# Patient Record
Sex: MALE | Race: BLACK OR AFRICAN AMERICAN | NOT HISPANIC OR LATINO | URBAN - METROPOLITAN AREA
[De-identification: names, ages, dates, MRNs, and addresses within clinical notes are randomized per-mention and may not be internally consistent; named-entity substitution may affect disease eponyms.]

---

## 2018-01-22 ENCOUNTER — INPATIENT (INPATIENT)
Facility: HOSPITAL | Age: 53
LOS: 0 days | Discharge: HOME | End: 2018-01-23
Attending: EMERGENCY MEDICINE | Admitting: INTERNAL MEDICINE

## 2018-02-05 DIAGNOSIS — R07.9 CHEST PAIN, UNSPECIFIED: ICD-10-CM

## 2018-02-05 DIAGNOSIS — R56.9 UNSPECIFIED CONVULSIONS: ICD-10-CM

## 2018-02-05 DIAGNOSIS — Z82.49 FAMILY HISTORY OF ISCHEMIC HEART DISEASE AND OTHER DISEASES OF THE CIRCULATORY SYSTEM: ICD-10-CM

## 2018-07-03 ENCOUNTER — EMERGENCY (EMERGENCY)
Facility: HOSPITAL | Age: 53
LOS: 0 days | Discharge: HOME | End: 2018-07-03
Admitting: PHYSICIAN ASSISTANT

## 2018-07-03 VITALS
RESPIRATION RATE: 18 BRPM | DIASTOLIC BLOOD PRESSURE: 81 MMHG | HEART RATE: 71 BPM | TEMPERATURE: 98 F | SYSTOLIC BLOOD PRESSURE: 126 MMHG | OXYGEN SATURATION: 95 %
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
## 2018-07-03 DIAGNOSIS — S30.817A ABRASION OF ANUS, INITIAL ENCOUNTER: ICD-10-CM

## 2018-07-03 DIAGNOSIS — R21 RASH AND OTHER NONSPECIFIC SKIN ERUPTION: ICD-10-CM
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
## 2018-07-03 NOTE — ED PROVIDER NOTE - SKIN, MLM
+ small ulceration along perineum without surrounding erythema ; no warmth ; no fluctuance ; no weeping/drainage

## 2018-07-03 NOTE — ED PROVIDER NOTE - OBJECTIVE STATEMENT
51 y/o M, no significant PMHx, presents to the ED with complaints of a 'rash' under his scrotum x three days. Patient states that he scratched area and sustained an abrasion. He then put on his sisters lotion which irritated his skin. He denies any surrounding redness, fever, abdominal pain, urinary symptoms, and swelling.

## 2018-07-03 NOTE — ED ADULT NURSE NOTE - OBJECTIVE STATEMENT
PT states he was itchy in groin area and applied scented lotion to area. Pt has been itching a lot and now has abrasion

## 2019-01-06 ENCOUNTER — INPATIENT (INPATIENT)
Facility: HOSPITAL | Age: 54
LOS: 1 days | Discharge: HOME | End: 2019-01-08
Attending: INTERNAL MEDICINE | Admitting: INTERNAL MEDICINE

## 2019-01-06 VITALS
TEMPERATURE: 97 F | RESPIRATION RATE: 20 BRPM | OXYGEN SATURATION: 100 % | SYSTOLIC BLOOD PRESSURE: 130 MMHG | HEART RATE: 100 BPM | DIASTOLIC BLOOD PRESSURE: 66 MMHG

## 2019-01-06 LAB
ALBUMIN SERPL ELPH-MCNC: 4.8 G/DL — SIGNIFICANT CHANGE UP (ref 3.5–5.2)
ALP SERPL-CCNC: 41 U/L — SIGNIFICANT CHANGE UP (ref 30–115)
ALT FLD-CCNC: 13 U/L — SIGNIFICANT CHANGE UP (ref 0–41)
ANION GAP SERPL CALC-SCNC: 18 MMOL/L — HIGH (ref 7–14)
APPEARANCE UR: ABNORMAL
AST SERPL-CCNC: 28 U/L — SIGNIFICANT CHANGE UP (ref 0–41)
BACTERIA # UR AUTO: ABNORMAL /HPF
BASE EXCESS BLDV CALC-SCNC: 2.1 MMOL/L — HIGH (ref -2–2)
BASOPHILS # BLD AUTO: 0.03 K/UL — SIGNIFICANT CHANGE UP (ref 0–0.2)
BASOPHILS NFR BLD AUTO: 0.5 % — SIGNIFICANT CHANGE UP (ref 0–1)
BILIRUB SERPL-MCNC: 0.5 MG/DL — SIGNIFICANT CHANGE UP (ref 0.2–1.2)
BILIRUB UR-MCNC: NEGATIVE — SIGNIFICANT CHANGE UP
BUN SERPL-MCNC: 13 MG/DL — SIGNIFICANT CHANGE UP (ref 10–20)
CA-I SERPL-SCNC: 1.16 MMOL/L — SIGNIFICANT CHANGE UP (ref 1.12–1.3)
CALCIUM SERPL-MCNC: 9.5 MG/DL — SIGNIFICANT CHANGE UP (ref 8.5–10.1)
CHLORIDE SERPL-SCNC: 99 MMOL/L — SIGNIFICANT CHANGE UP (ref 98–110)
CO2 SERPL-SCNC: 23 MMOL/L — SIGNIFICANT CHANGE UP (ref 17–32)
COLOR SPEC: YELLOW — SIGNIFICANT CHANGE UP
CREAT SERPL-MCNC: 1.2 MG/DL — SIGNIFICANT CHANGE UP (ref 0.7–1.5)
DIFF PNL FLD: NEGATIVE — SIGNIFICANT CHANGE UP
EOSINOPHIL # BLD AUTO: 0.14 K/UL — SIGNIFICANT CHANGE UP (ref 0–0.7)
EOSINOPHIL NFR BLD AUTO: 2.4 % — SIGNIFICANT CHANGE UP (ref 0–8)
EPI CELLS # UR: ABNORMAL /HPF
FLU A RESULT: NEGATIVE — SIGNIFICANT CHANGE UP
FLU A RESULT: NEGATIVE — SIGNIFICANT CHANGE UP
FLUAV AG NPH QL: NEGATIVE — SIGNIFICANT CHANGE UP
FLUBV AG NPH QL: NEGATIVE — SIGNIFICANT CHANGE UP
GAS PNL BLDV: 143 MMOL/L — SIGNIFICANT CHANGE UP (ref 136–145)
GAS PNL BLDV: SIGNIFICANT CHANGE UP
GLUCOSE SERPL-MCNC: 111 MG/DL — HIGH (ref 70–99)
GLUCOSE UR QL: NEGATIVE MG/DL — SIGNIFICANT CHANGE UP
HCO3 BLDV-SCNC: 27 MMOL/L — SIGNIFICANT CHANGE UP (ref 22–29)
HCT VFR BLD CALC: 41 % — LOW (ref 42–52)
HCT VFR BLDA CALC: 43.1 % — SIGNIFICANT CHANGE UP (ref 34–44)
HGB BLD CALC-MCNC: 14 G/DL — SIGNIFICANT CHANGE UP (ref 14–18)
HGB BLD-MCNC: 14.3 G/DL — SIGNIFICANT CHANGE UP (ref 14–18)
IMM GRANULOCYTES NFR BLD AUTO: 0.3 % — SIGNIFICANT CHANGE UP (ref 0.1–0.3)
KETONES UR-MCNC: NEGATIVE — SIGNIFICANT CHANGE UP
LACTATE BLDV-MCNC: 1.3 MMOL/L — SIGNIFICANT CHANGE UP (ref 0.5–1.6)
LACTATE SERPL-SCNC: 2.9 MMOL/L — HIGH (ref 0.5–2.2)
LEUKOCYTE ESTERASE UR-ACNC: NEGATIVE — SIGNIFICANT CHANGE UP
LIDOCAIN IGE QN: 32 U/L — SIGNIFICANT CHANGE UP (ref 7–60)
LYMPHOCYTES # BLD AUTO: 1.56 K/UL — SIGNIFICANT CHANGE UP (ref 1.2–3.4)
LYMPHOCYTES # BLD AUTO: 27.2 % — SIGNIFICANT CHANGE UP (ref 20.5–51.1)
MCHC RBC-ENTMCNC: 31.8 PG — HIGH (ref 27–31)
MCHC RBC-ENTMCNC: 34.9 G/DL — SIGNIFICANT CHANGE UP (ref 32–37)
MCV RBC AUTO: 91.3 FL — SIGNIFICANT CHANGE UP (ref 80–94)
MONOCYTES # BLD AUTO: 0.45 K/UL — SIGNIFICANT CHANGE UP (ref 0.1–0.6)
MONOCYTES NFR BLD AUTO: 7.8 % — SIGNIFICANT CHANGE UP (ref 1.7–9.3)
NEUTROPHILS # BLD AUTO: 3.54 K/UL — SIGNIFICANT CHANGE UP (ref 1.4–6.5)
NEUTROPHILS NFR BLD AUTO: 61.8 % — SIGNIFICANT CHANGE UP (ref 42.2–75.2)
NITRITE UR-MCNC: NEGATIVE — SIGNIFICANT CHANGE UP
NRBC # BLD: 0 /100 WBCS — SIGNIFICANT CHANGE UP (ref 0–0)
PCO2 BLDV: 43 MMHG — SIGNIFICANT CHANGE UP (ref 41–51)
PH BLDV: 7.41 — SIGNIFICANT CHANGE UP (ref 7.26–7.43)
PH UR: 7 — SIGNIFICANT CHANGE UP (ref 5–8)
PLATELET # BLD AUTO: 167 K/UL — SIGNIFICANT CHANGE UP (ref 130–400)
PO2 BLDV: 79 MMHG — HIGH (ref 20–40)
POTASSIUM BLDV-SCNC: 4.1 MMOL/L — SIGNIFICANT CHANGE UP (ref 3.3–5.6)
POTASSIUM SERPL-MCNC: 5.4 MMOL/L — HIGH (ref 3.5–5)
POTASSIUM SERPL-SCNC: 5.4 MMOL/L — HIGH (ref 3.5–5)
PROT SERPL-MCNC: 7.7 G/DL — SIGNIFICANT CHANGE UP (ref 6–8)
PROT UR-MCNC: ABNORMAL MG/DL
RBC # BLD: 4.49 M/UL — LOW (ref 4.7–6.1)
RBC # FLD: 13.2 % — SIGNIFICANT CHANGE UP (ref 11.5–14.5)
RSV RESULT: NEGATIVE — SIGNIFICANT CHANGE UP
RSV RNA RESP QL NAA+PROBE: NEGATIVE — SIGNIFICANT CHANGE UP
SAO2 % BLDV: 96 % — SIGNIFICANT CHANGE UP
SODIUM SERPL-SCNC: 140 MMOL/L — SIGNIFICANT CHANGE UP (ref 135–146)
SP GR SPEC: 1.02 — SIGNIFICANT CHANGE UP (ref 1.01–1.03)
TROPONIN T SERPL-MCNC: <0.01 NG/ML — SIGNIFICANT CHANGE UP
UROBILINOGEN FLD QL: 0.2 MG/DL — SIGNIFICANT CHANGE UP (ref 0.2–0.2)
WBC # BLD: 5.74 K/UL — SIGNIFICANT CHANGE UP (ref 4.8–10.8)
WBC # FLD AUTO: 5.74 K/UL — SIGNIFICANT CHANGE UP (ref 4.8–10.8)

## 2019-01-06 RX ORDER — SODIUM CHLORIDE 9 MG/ML
1000 INJECTION INTRAMUSCULAR; INTRAVENOUS; SUBCUTANEOUS ONCE
Qty: 0 | Refills: 0 | Status: COMPLETED | OUTPATIENT
Start: 2019-01-06 | End: 2019-01-06

## 2019-01-06 RX ORDER — MORPHINE SULFATE 50 MG/1
4 CAPSULE, EXTENDED RELEASE ORAL ONCE
Qty: 0 | Refills: 0 | Status: DISCONTINUED | OUTPATIENT
Start: 2019-01-06 | End: 2019-01-06

## 2019-01-06 RX ORDER — ONDANSETRON 8 MG/1
4 TABLET, FILM COATED ORAL ONCE
Qty: 0 | Refills: 0 | Status: COMPLETED | OUTPATIENT
Start: 2019-01-06 | End: 2019-01-06

## 2019-01-06 RX ADMIN — ONDANSETRON 4 MILLIGRAM(S): 8 TABLET, FILM COATED ORAL at 17:03

## 2019-01-06 RX ADMIN — SODIUM CHLORIDE 2000 MILLILITER(S): 9 INJECTION INTRAMUSCULAR; INTRAVENOUS; SUBCUTANEOUS at 20:16

## 2019-01-06 RX ADMIN — SODIUM CHLORIDE 2000 MILLILITER(S): 9 INJECTION INTRAMUSCULAR; INTRAVENOUS; SUBCUTANEOUS at 17:03

## 2019-01-06 RX ADMIN — MORPHINE SULFATE 4 MILLIGRAM(S): 50 CAPSULE, EXTENDED RELEASE ORAL at 17:03

## 2019-01-06 RX ADMIN — MORPHINE SULFATE 4 MILLIGRAM(S): 50 CAPSULE, EXTENDED RELEASE ORAL at 21:06

## 2019-01-06 NOTE — ED PROVIDER NOTE - NS ED ROS FT
Constitutional:  no fevers, no chills, no malaise  ENMT: No neck pain or stiffness, no nasal congestion, no ear pain, no throat pain  Cardiac:  No chest pain  Respiratory:  see hpi  GI:  see hpi  :  No dysuria, frequency or burning.  MS:  No back pain, no joint pain.  Neuro:  see hpi  Skin:  No skin rash  Except as documented in the HPI,  all other systems are negative

## 2019-01-06 NOTE — ED PROVIDER NOTE - MEDICAL DECISION MAKING DETAILS
Pt here with multiple complaints, abd pain/low back pain, cough, congestion, uri symptoms.  pt also had 2 breakthrough seizures.  extensive work up done in the ED negative for anything acute.  I spoke to neuro.  will admit pt to the [facility] epilepsy unit given the breakthrough seizures.

## 2019-01-06 NOTE — ED PROVIDER NOTE - CARE PLAN
Principal Discharge DX:	Seizure disorder  Secondary Diagnosis:	Abdominal pain  Secondary Diagnosis:	URI (upper respiratory infection)

## 2019-01-06 NOTE — ED ADULT NURSE NOTE - OBJECTIVE STATEMENT
Pt presents to ED with c/o abdominal pain and lower back pain. Pt also reports nonproductive cough and congestion. Pt endorses seizure last night and this morning, currently on antiepileptic medication.

## 2019-01-06 NOTE — ED PROVIDER NOTE - PROGRESS NOTE DETAILS
pt had normal CCTA in january 2018 a/p:  pt with abd pain, low back pain, chest pain and sob for one week.  pt also had breakthrough seizure yesterday and today.  p: ct, labs, ivf, supportive care, cxr, ekg, reassess. I spoke to neuro NP.  will admit to [facility] epilepsy unit. bed management aware

## 2019-01-06 NOTE — ED PROVIDER NOTE - OBJECTIVE STATEMENT
54 yo m with pmh of seizure disorder on keppra and phenobarb compliant with meds presents with multiple complaints.  pt reports he has breakthough seizure approx once a week.  pt with abd pain and low back pain for one week.  pt also with cough, congestion, uri symptoms and fever for one week.  pt had seziure last night and then this morning.  after seizure this morning pt went to [employer] and wasn't feeling well and came to hospital.  no vomiting, no headache, no neck pain, no neck stiffness.  no urinary complaints.  pt also with pleuritic pain, cough.  ? history of possible blood clot

## 2019-01-06 NOTE — ED ADULT NURSE NOTE - NSIMPLEMENTINTERV_GEN_ALL_ED
Implemented All Fall with Harm Risk Interventions:  [place] to call system. Call bell, personal items and telephone within reach. Instruct patient to call for assistance. Room bathroom lighting operational. Non-slip footwear when patient is off stretcher. Physically safe environment: no spills, clutter or unnecessary equipment. Stretcher in lowest position, wheels locked, appropriate side rails in place. Provide visual cue, wrist band, yellow gown, etc. Monitor gait and stability. Monitor for mental status changes and reorient to person, place, and time. Review medications for side effects contributing to fall risk. Reinforce activity limits and safety measures with patient and family. Provide visual clues: red socks.

## 2019-01-06 NOTE — ED ADULT NURSE REASSESSMENT NOTE - NS ED NURSE REASSESS COMMENT FT1
Received from previous rn; pt aox4 . complaining of abdominal pain. In no respiratory distress. vss. pending ua.  IVF infusing as per order, pain medications administered as per order. pt encouraged to urinate for ua.  Will continue to monitor/assess

## 2019-01-06 NOTE — ED PROVIDER NOTE - PHYSICAL EXAMINATION
CONSTITUTIONAL: Well-developed; well-nourished; in no acute distress, nontoxic appearing  SKIN: skin exam is warm and dry,  HEAD: Normocephalic; atraumatic.  EYES: eomi  ENT: MMM, no nasal congestion  NECK: Supple; non tender.  ROM intact.  CARD: S1, S2 normal, no murmur  RESP: No wheezes, rales or rhonchi. Good air movement bilaterally  ABD: soft; mild diffuse tenderness, no rebound, no guarding  EXT: Normal ROM. No cyanosis   NEURO: awake, alert, following commands, oriented, grossly unremarkable. No Focal deficits. GCS 15.   motor/sensatioin intact in all 4 ext.  PSYCH: Cooperative, appropriate.

## 2019-01-07 DIAGNOSIS — N18.2 CHRONIC KIDNEY DISEASE, STAGE 2 (MILD): ICD-10-CM

## 2019-01-07 DIAGNOSIS — J06.9 ACUTE UPPER RESPIRATORY INFECTION, UNSPECIFIED: ICD-10-CM

## 2019-01-07 DIAGNOSIS — G40.909 EPILEPSY, UNSPECIFIED, NOT INTRACTABLE, WITHOUT STATUS EPILEPTICUS: ICD-10-CM

## 2019-01-07 LAB
ALBUMIN SERPL ELPH-MCNC: 3.9 G/DL — SIGNIFICANT CHANGE UP (ref 3.5–5.2)
ALP SERPL-CCNC: 43 U/L — SIGNIFICANT CHANGE UP (ref 30–115)
ALT FLD-CCNC: 10 U/L — SIGNIFICANT CHANGE UP (ref 0–41)
ANION GAP SERPL CALC-SCNC: 9 MMOL/L — SIGNIFICANT CHANGE UP (ref 7–14)
AST SERPL-CCNC: 15 U/L — SIGNIFICANT CHANGE UP (ref 0–41)
BILIRUB SERPL-MCNC: 0.6 MG/DL — SIGNIFICANT CHANGE UP (ref 0.2–1.2)
BUN SERPL-MCNC: 10 MG/DL — SIGNIFICANT CHANGE UP (ref 10–20)
CALCIUM SERPL-MCNC: 8.3 MG/DL — LOW (ref 8.5–10.1)
CHLORIDE SERPL-SCNC: 106 MMOL/L — SIGNIFICANT CHANGE UP (ref 98–110)
CO2 SERPL-SCNC: 26 MMOL/L — SIGNIFICANT CHANGE UP (ref 17–32)
CREAT SERPL-MCNC: 1.1 MG/DL — SIGNIFICANT CHANGE UP (ref 0.7–1.5)
GLUCOSE SERPL-MCNC: 101 MG/DL — HIGH (ref 70–99)
HCT VFR BLD CALC: 37 % — LOW (ref 42–52)
HGB BLD-MCNC: 12.9 G/DL — LOW (ref 14–18)
MAGNESIUM SERPL-MCNC: 2.1 MG/DL — SIGNIFICANT CHANGE UP (ref 1.8–2.4)
MCHC RBC-ENTMCNC: 32.5 PG — HIGH (ref 27–31)
MCHC RBC-ENTMCNC: 34.9 G/DL — SIGNIFICANT CHANGE UP (ref 32–37)
MCV RBC AUTO: 93.2 FL — SIGNIFICANT CHANGE UP (ref 80–94)
NRBC # BLD: 0 /100 WBCS — SIGNIFICANT CHANGE UP (ref 0–0)
PHENOBARB SERPL-MCNC: 4.1 UG/ML — LOW (ref 15–40)
PLATELET # BLD AUTO: 150 K/UL — SIGNIFICANT CHANGE UP (ref 130–400)
POTASSIUM SERPL-MCNC: 4.3 MMOL/L — SIGNIFICANT CHANGE UP (ref 3.5–5)
POTASSIUM SERPL-SCNC: 4.3 MMOL/L — SIGNIFICANT CHANGE UP (ref 3.5–5)
PROT SERPL-MCNC: 6.7 G/DL — SIGNIFICANT CHANGE UP (ref 6–8)
RBC # BLD: 3.97 M/UL — LOW (ref 4.7–6.1)
RBC # FLD: 13.2 % — SIGNIFICANT CHANGE UP (ref 11.5–14.5)
SODIUM SERPL-SCNC: 141 MMOL/L — SIGNIFICANT CHANGE UP (ref 135–146)
VALPROATE SERPL-MCNC: <3 UG/ML — LOW (ref 50–100)
WBC # BLD: 4.92 K/UL — SIGNIFICANT CHANGE UP (ref 4.8–10.8)
WBC # FLD AUTO: 4.92 K/UL — SIGNIFICANT CHANGE UP (ref 4.8–10.8)

## 2019-01-07 RX ORDER — CHLORHEXIDINE GLUCONATE 213 G/1000ML
1 SOLUTION TOPICAL EVERY 12 HOURS
Qty: 0 | Refills: 0 | Status: DISCONTINUED | OUTPATIENT
Start: 2019-01-07 | End: 2019-01-08

## 2019-01-07 RX ORDER — LEVETIRACETAM 250 MG/1
500 TABLET, FILM COATED ORAL EVERY 24 HOURS
Qty: 0 | Refills: 0 | Status: DISCONTINUED | OUTPATIENT
Start: 2019-01-08 | End: 2019-01-08

## 2019-01-07 RX ORDER — ACETAMINOPHEN 500 MG
650 TABLET ORAL EVERY 6 HOURS
Qty: 0 | Refills: 0 | Status: DISCONTINUED | OUTPATIENT
Start: 2019-01-07 | End: 2019-01-08

## 2019-01-07 RX ORDER — LEVETIRACETAM 250 MG/1
500 TABLET, FILM COATED ORAL
Qty: 0 | Refills: 0 | Status: DISCONTINUED | OUTPATIENT
Start: 2019-01-07 | End: 2019-01-07

## 2019-01-07 RX ORDER — HEPARIN SODIUM 5000 [USP'U]/ML
5000 INJECTION INTRAVENOUS; SUBCUTANEOUS EVERY 12 HOURS
Qty: 0 | Refills: 0 | Status: DISCONTINUED | OUTPATIENT
Start: 2019-01-07 | End: 2019-01-08

## 2019-01-07 RX ORDER — PHENOBARBITAL 60 MG
32.4 TABLET ORAL EVERY 12 HOURS
Qty: 0 | Refills: 0 | Status: DISCONTINUED | OUTPATIENT
Start: 2019-01-07 | End: 2019-01-08

## 2019-01-07 RX ORDER — INFLUENZA VIRUS VACCINE 15; 15; 15; 15 UG/.5ML; UG/.5ML; UG/.5ML; UG/.5ML
0.5 SUSPENSION INTRAMUSCULAR ONCE
Qty: 0 | Refills: 0 | Status: DISCONTINUED | OUTPATIENT
Start: 2019-01-07 | End: 2019-01-08

## 2019-01-07 RX ORDER — LEVETIRACETAM 250 MG/1
1000 TABLET, FILM COATED ORAL AT BEDTIME
Qty: 0 | Refills: 0 | Status: DISCONTINUED | OUTPATIENT
Start: 2019-01-07 | End: 2019-01-08

## 2019-01-07 RX ORDER — PHENOBARBITAL 60 MG
30 TABLET ORAL
Qty: 0 | Refills: 0 | Status: DISCONTINUED | OUTPATIENT
Start: 2019-01-07 | End: 2019-01-07

## 2019-01-07 RX ORDER — HEPARIN SODIUM 5000 [USP'U]/ML
5000 INJECTION INTRAVENOUS; SUBCUTANEOUS EVERY 12 HOURS
Qty: 0 | Refills: 0 | Status: DISCONTINUED | OUTPATIENT
Start: 2019-01-07 | End: 2019-01-07

## 2019-01-07 RX ORDER — PANTOPRAZOLE SODIUM 20 MG/1
40 TABLET, DELAYED RELEASE ORAL
Qty: 0 | Refills: 0 | Status: DISCONTINUED | OUTPATIENT
Start: 2019-01-07 | End: 2019-01-08

## 2019-01-07 RX ADMIN — Medication 650 MILLIGRAM(S): at 23:44

## 2019-01-07 RX ADMIN — Medication 32.4 MILLIGRAM(S): at 23:44

## 2019-01-07 RX ADMIN — LEVETIRACETAM 1000 MILLIGRAM(S): 250 TABLET, FILM COATED ORAL at 21:23

## 2019-01-07 RX ADMIN — PANTOPRAZOLE SODIUM 40 MILLIGRAM(S): 20 TABLET, DELAYED RELEASE ORAL at 06:56

## 2019-01-07 RX ADMIN — Medication 30 MILLIGRAM(S): at 06:57

## 2019-01-07 RX ADMIN — LEVETIRACETAM 500 MILLIGRAM(S): 250 TABLET, FILM COATED ORAL at 06:56

## 2019-01-07 NOTE — H&P ADULT - REASON FOR ADMISSION
Repeated seizures  since last night/  vague pain  in chest/ back, abdomen and all over the body  Fever, cough/---------n/a

## 2019-01-07 NOTE — H&P ADULT - HISTORY OF PRESENT ILLNESS
Objective Statement: 54 yo m with pmh of seizure disorder on keppra and phenobarb compliant with meds presents with multiple complaints.  pt reports he has breakthough seizure approx once a week.  pt with abd pain and low back pain for one week.  pt also with cough, congestion, uri symptoms and fever for one week.  pt had seziure last night and then this morning.  after seizure this morning pt went to [place] and wasn't feeling well and came to hospital.  no vomiting, no headache, no neck pain, no neck stiffness.  no urinary complain

## 2019-01-07 NOTE — H&P ADULT - NSHPLABSRESULTS_GEN_ALL_CORE
14.3   5.74  )-----------( 167      ( 2019 16:50 )             41.0     -06    140  |  99  |  13  ----------------------------<  111<H>  5.4<H>   |  23  |  1.2    Ca    9.5      2019 16:50    TPro  7.7  /  Alb  4.8  /  TBili  0.5  /  DBili  x   /  AST  28  /  ALT  13  /  AlkPhos  41            Urinalysis Basic - ( 2019 21:50 )    Color: Yellow / Appearance: Cloudy / S.020 / pH: x  Gluc: x / Ketone: Negative  / Bili: Negative / Urobili: 0.2 mg/dL   Blood: x / Protein: Trace mg/dL / Nitrite: Negative   Leuk Esterase: Negative / RBC: x / WBC x   Sq Epi: x / Non Sq Epi: Occasional /HPF / Bacteria: Few /HPF        Lactate Trend   @ 16:50 Lactate:2.9     CARDIAC MARKERS ( 2019 16:50 )  x     / <0.01 ng/mL / x     / x     / x          CAPILLARY BLOOD GLUCOSE

## 2019-01-07 NOTE — CONSULT NOTE ADULT - ATTENDING COMMENTS
Agree with the history  The history of the events/seizures  and the syndrome is not consistent.  The F/U in the past as described above is also not done regularly.  Will do a trough level  will do the V-EEG  no change in AED

## 2019-01-07 NOTE — H&P ADULT - NSHPREVIEWOFSYSTEMS_GEN_ALL_CORE
REVIEW OF SYSTEMS:      CONSTITUTIONAL:     fever===n/a  EYES/ENT:                      pain   all over the body aand neck  NECK:       RESPIRATORY:          copd------------n/a  CARDIOVASCULAR:     htn   n/a  GASTROINTESTINAL:    dm-----n/a   GENITOURINARY:      Nil contributary  NEUROLOGICAL:      H/o seizures since child sanchez  SKIN:

## 2019-01-07 NOTE — H&P ADULT - ASSESSMENT
Patient is a 53y old  Male who presents with a chief complaint of    sizures/ aching all ovr the body ever since                                                                                                                                                                                                                                                                                    HEALTH ISSUES - PROBLEM Dx:seizures/  body ache

## 2019-01-07 NOTE — PROGRESS NOTE ADULT - SUBJECTIVE AND OBJECTIVE BOX
JEFF CHO  53y  Male      Patient is a 53y old  Male who presents with a chief complaint of Repeated seizures  since last night/  vague pain  in chest/ back, abdomen and all over the body  Fever, cough/---------n/a (2019 13:21)      INTERVAL HPI/OVERNIGHT EVENTS:    patient admitted for VEEG monitoring  -seizure precautions  -labs and imaging studies reviewed (negative so far)    -Vital Signs Last 24 Hrs  T(C): 35.8 (2019 14:41), Max: 36.1 (2019 20:13)  T(F): 96.4 (2019 14:41), Max: 97 (2019 20:13)  HR: 80 (2019 14:41) (60 - 100)  BP: 124/65 (2019 14:41) (111/59 - 130/66)  RR: 18 (2019 14:41) (18 - 20)  SpO2: 97% (2019 00:00) (96% - 100%)    PHYSICAL EXAM:  GENERAL: NAD, tired, does not want to be bothered   HEAD:  Atraumatic, Normocephalic  EYES: EOMI, PERRLA, conjunctiva and sclera clear  NERVOUS SYSTEM:  Alert & Oriented X 3  CHEST/LUNG: Clear to percussion bilaterally; No rales, rhonchi, wheezing, or rubs  CV/HEART: Regular rate and rhythm; No murmurs, rubs, or gallops  GI/ABDOMEN: Soft, Nontender, Nondistended; Bowel sounds present  EXTREMITIES:  2+ Peripheral Pulses, No clubbing, cyanosis, or edema  SKIN: No rashes or lesions    LAB:                        12.9   4.92  )-----------( 150      ( 2019 12:07 )             37.0     01-07    141  |  106  |  10  ----------------------------<  101<H>  4.3   |  26  |  1.1    Ca    8.3<L>      2019 12:07  Mg     2.1     01-07    TPro  6.7  /  Alb  3.9  /  TBili  0.6  /  DBili  x   /  AST  15  /  ALT  10  /  AlkPhos  43  01-07    CARDIAC MARKERS ( 2019 16:50 )  x     / <0.01 ng/mL / x     / x     / x        Daily Height in cm: 182.88 (2019 01:55)    Daily   CAPILLARY BLOOD GLUCOSE    Urinalysis Basic - ( 2019 21:50 )    Color: Yellow / Appearance: Cloudy / S.020 / pH: x  Gluc: x / Ketone: Negative  / Bili: Negative / Urobili: 0.2 mg/dL   Blood: x / Protein: Trace mg/dL / Nitrite: Negative   Leuk Esterase: Negative / RBC: x / WBC x   Sq Epi: x / Non Sq Epi: Occasional /HPF / Bacteria: Few /HPF    LIVER FUNCTIONS - ( 2019 12:07 )  Alb: 3.9 g/dL / Pro: 6.7 g/dL / ALK PHOS: 43 U/L / ALT: 10 U/L / AST: 15 U/L / GGT: x           RADIOLOGY:    Imaging Personally Reviewed:  [Y ] YES  [ ] NO    HEALTH ISSUES - PROBLEM Dx:  URI (upper respiratory infection): URI (upper respiratory infection)  Seizure disorder: Seizure disorder    MEDS:  heparin  Injectable 5000 Unit(s) SubCutaneous every 12 hours  influenza   Vaccine 0.5 milliLiter(s) IntraMuscular once  levETIRAcetam 1000 milliGRAM(s) Oral at bedtime  LORazepam   Injectable 2 milliGRAM(s) IV Push three times a day PRN  pantoprazole    Tablet 40 milliGRAM(s) Oral before breakfast  PHENobarbital 32.4 milliGRAM(s) Oral every 12 hours

## 2019-01-07 NOTE — CONSULT NOTE ADULT - SUBJECTIVE AND OBJECTIVE BOX
Neurology/Epilepsy Consult:    JEFF CHO 53y Male  MRN-[id number]    Patient is a 53y old  Male who presents with a chief complaint of seizures last night, pain  in chest/ back, abdomen and all over the body with Fever, cough      HPI: History obtained from patient and family, as well as PMD office. EMR and I-STOP reviewed.   Patient reports having history of seizures since infancy. He started taking AED about 20 years ago. Patient current seizure frequency is about 1-2 episodes per week, and he is not usually going to the hospital for that. He came to ED because of body aches with chest discomfort, as well as abdominal pain. Patient states he is being followed by PMD Dr. Beaulieu and neurologist Dr. Bustos. Patient states he is taking Phenobarbital 30mg q12hrs, Keppra 500mg in AM and 1000mg in HS, Depakote 500mg in AM and 1000mg in HS. He initially said does not remember exactly what pharmacy he is using, but then gave me locations of 2 different pharmacies where he was filling medications.    I contacted Dr. Bustos office, and was told last time patient was seen in 2012. Per Dr. Beaulieu office, patient was last seen in 2017. Per Dr. Beaulieu records, patient was on Keppra 500mg q8hrs, Phenobarbital 60mg q12hrs, Depakote 500mg daily. Both pharmacies have no record of patient filling any medications in the last 18 months.   Stat levels of phenobarbital and VPA ordered this morning (it must be noted that patient received Phenobarbital 30mg 5 hrs prior to blood test). Phenobarbital level 4.1, VPA <3.     After receiving test results I asked patient to clarify the doses of medications, and he said that stopped taking VPA some time ago since it was making him tired and nauseated. He stated this time that he fillls his Rx from [facility] since spends a lot of time there. Patient also mentioned that he sometimes forgets to take medications.    Patient reports 2 types of seizures;  1. Generalized seizures with LOC lasting 2-3 min, at times preceded by feeling shaky, often associated with tongue bite and urinary incontinence. Happen about 1-2 times/week. Most recent episode yesterday morning, associated with urinary incontinence, no tongue bite, and followed by fatigue. Patient had a similar episode one day prior.  2/ Episodes of blank staring noticed by others, patient has no memory of those. Happen 1-2 times/month, unclear duration, then back to baseline immediately.    Per patient's uncle who came to visit, typical seizure is described as confusion lasting few seconds, at times associated with fall, but no LOC, then patient appears tired and goes to sleep. Patient's aunt never witnessed his seizures. I attempted to call patient's mother, got voicemail, left message.        PAST MEDICAL & SURGICAL HISTORY:  Seizure disorder        FAMILY HISTORY:  father and 2 nieces - epilepsy      Social History: (-) x 3      Risk factors:  Born full-term, no complications  Normal growth and development  No head trauma with loss of consciousness      Allergy:  No Known Allergies        Home Medications as per patient (unable to confirm - see HPI):  Keppra 500mg in AM and 1000mg in HS   PHENobarbital 30mg q12hrs        MEDICATIONS  (STANDING):  heparin  Injectable 5000 Unit(s) SubCutaneous every 12 hours  influenza   Vaccine 0.5 milliLiter(s) IntraMuscular once  levETIRAcetam 500 milliGRAM(s) Oral two times a day  pantoprazole    Tablet 40 milliGRAM(s) Oral before breakfast  PHENobarbital Elixir 30 milliGRAM(s) Oral two times a day    MEDICATIONS  (PRN):  LORazepam   Injectable 2 milliGRAM(s) IV Push three times a day PRN generalized tonic-clonic seizure lasting longer than 2 minutes          T(F): 96 (19 @ 05:38), Max: 97 (19 @ 20:13)  HR: 62 (19 @ 05:38) (60 - 100)  BP: 113/69 (19 @ 05:38) (111/59 - 130/66)  RR: 18 (19 @ 05:38) (18 - 20)  SpO2: 97% (19 @ 00:00) (96% - 100%)      Neurologic Examination:  General:  Appearance is consistent with chronologic age.  No abnormal facies.   General: The patient is oriented to person, place, time and date.  Recent and remote memory intact.  Follows 3-4-step directions. Fund of knowledge is intact and normal.  Language with normal repetition, comprehension and naming.     Cranial nerves: EOMI w/o nystagmus, skew or reported double vision.  PERRL.  No ptosis/weakness of eyelid closure.  Facial sensation is normal with normal bite.  No facial asymmetry.  Hearing grossly intact b/l.  Palate elevates midline.  Tongue midline.  Motor examination:   Normal tone, bulk and range of motion.  No tenderness, twitching, tremors or involuntary movements.  Formal Muscle Strength Testin/5 UE; 5/5 LE.  No observable drift.  Reflexes:   2+ b/l pectoralis, biceps, triceps, brachioradialis, patella and Achilles.    Sensory examination:   Intact to light touch and pinprick, pain.  Cerebellum:   FTN/HKS intact with normal VAUGHN in all limbs.  No dysmetria or dysdiadokinesia.  Gait narrow based and normal.      Labs:                         12.9   4.92  )-----------( 150      ( 2019 12:07 )             37.0         141  |  106  |  10  ----------------------------<  101<H>  4.3   |  26  |  1.1    Ca    8.3<L>      2019 12:07  Mg     2.1         TPro  6.7  /  Alb  3.9  /  TBili  0.6  /  DBili  x   /  AST  15  /  ALT  10  /  AlkPhos  43      LIVER FUNCTIONS - ( 2019 12:07 )  Alb: 3.9 g/dL / Pro: 6.7 g/dL / ALK PHOS: 43 U/L / ALT: 10 U/L / AST: 15 U/L / GGT: x             Urinalysis Basic - ( 2019 21:50 )    Color: Yellow / Appearance: Cloudy / S.020 / pH: x  Gluc: x / Ketone: Negative  / Bili: Negative / Urobili: 0.2 mg/dL   Blood: x / Protein: Trace mg/dL / Nitrite: Negative   Leuk Esterase: Negative / RBC: x / WBC x   Sq Epi: x / Non Sq Epi: Occasional /HPF / Bacteria: Few /HPF        Phenobarbital Level, Serum: 4.1 ug/mL [15.0 - 40.0] (19 @ 12:07)  Valproic Acid Level, Serum: <3.0 ug/mL [50.0 - 100.0] (19 @ 12:07)        Neuroimaging:  NCHCT: CT Head No Cont:   EXAM:  CT BRAIN          PROCEDURE DATE:  2019      INTERPRETATION:  Clinical History / Reason for exam: Seizure.    Technique: Multiple axial CT images of the head were obtained from the   base of the skull to the vertex without the administration of IV   contrast. Sagittal and coronal reformats were obtained.    Comparison: CT head 16.    Findings:    The ventricular system, basal cisterns, and sulcal pattern are   appropriate for patients age.    There is no acute extra-axial collection.  No mass effect, midline shift   or acute hemorrhage is seen.      Gray-white differentiation appears preserved.    There is no depressed skull fracture.     The paranasal sinuses and mastoid air cells are well-aerated.    Impression:    No evidence of acute intracranial abnormality.    DANIEL MONTES M.D., ATTENDING RADIOLOGIST  This document has been electronically signed. 2019  9:32PM  (19 @ 21:22)      VEEG  - 2012 - normal  VEEG 3/28 - 3/29/2005 - runs of spike-wave discharges suggestive of PGE        Assessment:  This is a 53y Male with h/o seizure disorder since infancy, with multiple reported  breakthrough events. Patient is a poor historian, doses of AEDs could not be confirmed with pharmacy or MD since patient reportedly gets medications outside of the [place]. Patient is non-compliant with follow ups and admits to missing medications at times.      Discussed with Dr. Gutierrez      Plan:   - VEEG for better characterization and assessment  - Seizure precautions  - Will not re-start VPA since the level is not detectable  - Will continue the doses of Phenobarbital abd Keppra as reported by patient  - Ativan 2mg IV PRN for generalized tonic-clonic seizure lasting longer than 2 minutes, or two consecutive seizures without return to baseline in-between (ordered)  - CBC, CMP, Mg, AED levels trough (ordered)  - Keep Mg above 2    Plan discussed with patient in details, all questions answered

## 2019-01-07 NOTE — PROGRESS NOTE ADULT - ASSESSMENT
Patient is a 53y old  Male who presents with a chief complaint of    seizures aching all ovr the body ever since                                                                                                                                                                                                                                                                                    HEALTH ISSUES - PROBLEM Dx:seizures/  body ache

## 2019-01-07 NOTE — PROGRESS NOTE ADULT - PROBLEM SELECTOR PLAN 3
-suspected CKD II; needs 3 months kidney monitoring for proper diagnosis and nephrology follow up on the outside

## 2019-01-08 VITALS
DIASTOLIC BLOOD PRESSURE: 79 MMHG | SYSTOLIC BLOOD PRESSURE: 135 MMHG | TEMPERATURE: 97 F | HEART RATE: 65 BPM | RESPIRATION RATE: 18 BRPM

## 2019-01-08 RX ORDER — LEVETIRACETAM 250 MG/1
1 TABLET, FILM COATED ORAL
Qty: 0 | Refills: 0 | DISCHARGE
Start: 2019-01-08

## 2019-01-08 RX ORDER — LEVETIRACETAM 250 MG/1
0 TABLET, FILM COATED ORAL
Qty: 0 | Refills: 0 | COMMUNITY

## 2019-01-08 RX ORDER — PHENOBARBITAL 60 MG
0 TABLET ORAL
Qty: 0 | Refills: 0 | COMMUNITY

## 2019-01-08 RX ORDER — DIVALPROEX SODIUM 500 MG/1
0 TABLET, DELAYED RELEASE ORAL
Qty: 0 | Refills: 0 | COMMUNITY

## 2019-01-08 RX ORDER — PHENOBARBITAL 60 MG
1 TABLET ORAL
Qty: 0 | Refills: 0 | DISCHARGE
Start: 2019-01-08

## 2019-01-08 RX ADMIN — PANTOPRAZOLE SODIUM 40 MILLIGRAM(S): 20 TABLET, DELAYED RELEASE ORAL at 06:05

## 2019-01-08 NOTE — DISCHARGE NOTE ADULT - CARE PROVIDERS DIRECT ADDRESSES
[email or web address]_Unknown

## 2019-01-08 NOTE — DISCHARGE NOTE ADULT - HOSPITAL COURSE
***patient seen and examined at bedside. Spent over 40mins d/c planning, d/c papers and med rec ***                        Vital Signs Last 24 Hrs  T(C): 36.3 (08 Jan 2019 05:20), Max: 36.3 (08 Jan 2019 05:20)  T(F): 97.3 (08 Jan 2019 05:20), Max: 97.3 (08 Jan 2019 05:20)  HR: 65 (08 Jan 2019 05:20) (65 - 80)  BP: 135/79 (08 Jan 2019 05:20) (119/77 - 135/79)  RR: 18 (08 Jan 2019 05:20) (18 - 18)    pt seen and examined at bedside, no complaints, comfortable in bed; refusing to stay inpatient      Problem/Plan - 1:  ·  Problem: Seizure disorder.  Plan: -VEEG  -patient is refusing to stay for VEEG monitoring today (recommended by Epilepsy team)  -patient has his seizure medications (he gets them from [facility]); to continue current AEDs as patient did not complete recommended VEEG and medications cannot be adjusted if needed to be.  -pt was told not to drive  -he wants to follow up with his PMD Dr. Reeder and will follow up with the neurologist of his choice (will still provide our neurology team's contact information)     Problem/Plan - 2:  ·  Problem: URI (upper respiratory infection).  Plan: -symptomatic treatment over the counter  -imaging studies reviewed; no signs of infection.      Problem/Plan - 3:  ·  Problem: Stage 2 chronic kidney disease.  Plan: -suspected CKD II; needs 3 months kidney monitoring for proper diagnosis and nephrology follow up on the outside.     Attending Attestation:   30 minutes spent on total encounter; more than 50% of the visit was spent counseling and/or coordinating care by the attending physician.     Plan discussed with medical staff.

## 2019-01-08 NOTE — DISCHARGE NOTE ADULT - PATIENT PORTAL LINK FT
You can access the [facility] Portal, offered by [facility], by registering with the following website: [email or web address]

## 2019-01-08 NOTE — DISCHARGE NOTE ADULT - CARE PROVIDER_API CALL
Alan Gutierrez), Neurology  [address] [place] [address]  Phone: [phone]  Fax: [phone]    Dr. Reeder,   Phone: (   )    -  Fax: (   )    -

## 2019-01-08 NOTE — PROGRESS NOTE ADULT - SUBJECTIVE AND OBJECTIVE BOX
· Hospital Course	  ***patient seen and examined at bedside. Spent over 40mins d/c planning, d/c papers and med rec ***                        Vital Signs Last 24 Hrs  T(C): 36.3 (08 Jan 2019 05:20), Max: 36.3 (08 Jan 2019 05:20)  T(F): 97.3 (08 Jan 2019 05:20), Max: 97.3 (08 Jan 2019 05:20)  HR: 65 (08 Jan 2019 05:20) (65 - 80)  BP: 135/79 (08 Jan 2019 05:20) (119/77 - 135/79)  RR: 18 (08 Jan 2019 05:20) (18 - 18)    pt seen and examined at bedside, no complaints, comfortable in bed; refusing to stay inpatient   -ambulating with no difficulties      Problem/Plan - 1:  ·  Problem: Seizure disorder.  Plan: -VEEG  -patient is refusing to stay for VEEG monitoring today (recommended by Epilepsy team)  -patient has his seizure medications (he gets them from [facility]); to continue current AEDs as patient did not complete recommended VEEG and medications cannot be adjusted if needed to be.  -pt was told not to drive  -he wants to follow up with his PMD Dr. Reeder and will follow up with the neurologist of his choice (will still provide our neurology team's contact information)     Problem/Plan - 2:  ·  Problem: URI (upper respiratory infection).  Plan: -symptomatic treatment over the counter  -imaging studies reviewed; no signs of infection.      Problem/Plan - 3:  ·  Problem: Stage 2 chronic kidney disease.  Plan: -suspected CKD II; needs 3 months kidney monitoring for proper diagnosis and nephrology follow up on the outside.     Attending Attestation:   30 minutes spent on total encounter; more than 50% of the visit was spent counseling and/or coordinating care by the attending physician.     Plan discussed with medical staff.

## 2019-01-08 NOTE — DISCHARGE NOTE ADULT - CARE PLAN
Principal Discharge DX:	Seizure disorder  Goal:	to be symptom free  Assessment and plan of treatment:	-continue your home seizure medications

## 2019-01-08 NOTE — PROGRESS NOTE ADULT - SUBJECTIVE AND OBJECTIVE BOX
Epilepsy Attending Note:     JEFF CHO    53y Male  MRN [id number]    Vital Signs Last 24 Hrs  T(C): 36.3 (08 Jan 2019 05:20), Max: 36.3 (08 Jan 2019 05:20)  T(F): 97.3 (08 Jan 2019 05:20), Max: 97.3 (08 Jan 2019 05:20)  HR: 65 (08 Jan 2019 05:20) (65 - 80)  BP: 135/79 (08 Jan 2019 05:20) (119/77 - 135/79)  BP(mean): --  RR: 18 (08 Jan 2019 05:20) (18 - 18)  SpO2: --                          12.9   4.92  )-----------( 150      ( 07 Jan 2019 12:07 )             37.0       01-07    141  |  106  |  10  ----------------------------<  101<H>  4.3   |  26  |  1.1    Ca    8.3<L>      07 Jan 2019 12:07  Mg     2.1     01-07    TPro  6.7  /  Alb  3.9  /  TBili  0.6  /  DBili  x   /  AST  15  /  ALT  10  /  AlkPhos  43  01-07      MEDICATIONS  (STANDING):  chlorhexidine 4% Liquid 1 Application(s) Topical every 12 hours  heparin  Injectable 5000 Unit(s) SubCutaneous every 12 hours  influenza   Vaccine 0.5 milliLiter(s) IntraMuscular once  levETIRAcetam 500 milliGRAM(s) Oral every 24 hours  levETIRAcetam 1000 milliGRAM(s) Oral at bedtime  pantoprazole    Tablet 40 milliGRAM(s) Oral before breakfast  PHENobarbital 32.4 milliGRAM(s) Oral every 12 hours    MEDICATIONS  (PRN):  acetaminophen   Tablet .. 650 milliGRAM(s) Oral every 6 hours PRN Temp greater or equal to 38C (100.4F), Mild Pain (1 - 3)  LORazepam   Injectable 2 milliGRAM(s) IV Push three times a day PRN generalized tonic-clonic seizure lasting longer than 2 minutes      Phenobarbital Level, Serum: 4.1 ug/mL [15.0 - 40.0] (01-07-19 @ 12:07)  Valproic Acid Level, Serum: <3.0 ug/mL [50.0 - 100.0] (01-07-19 @ 12:07)        VEEG in the last 24 hours:    Background----8-9    Focal and generalized slowing----none    Interictal activity-------none    Events---none    Seizures---none    Impression:   normal A/D/S    Plan - he wants to leave           he wants to have F/U only with his primary MD           he refused blood drawing.          we talked about the result and I had told her not to drive and the fact that for long term treatment plan he does need a V-EEG study

## 2019-01-08 NOTE — DISCHARGE NOTE ADULT - MEDICATION SUMMARY - MEDICATIONS TO TAKE
I will START or STAY ON the medications listed below when I get home from the hospital:    levETIRAcetam 500 mg oral tablet  -- 1 tab(s) by mouth every 24 hours  -- Indication: For Seizure disorder    levETIRAcetam 1000 mg oral tablet  -- 1 tab(s) by mouth once a day (at bedtime)  -- Indication: For Seizure disorder    PHENobarbital 32.4 mg oral tablet  -- 1 tab(s) by mouth every 12 hours  -- Indication: For Seizure disorder

## 2019-01-10 DIAGNOSIS — N18.2 CHRONIC KIDNEY DISEASE, STAGE 2 (MILD): ICD-10-CM

## 2019-01-10 DIAGNOSIS — G40.909 EPILEPSY, UNSPECIFIED, NOT INTRACTABLE, WITHOUT STATUS EPILEPTICUS: ICD-10-CM

## 2019-01-10 DIAGNOSIS — Z91.14 PATIENT'S OTHER NONCOMPLIANCE WITH MEDICATION REGIMEN: ICD-10-CM

## 2019-01-10 DIAGNOSIS — J06.9 ACUTE UPPER RESPIRATORY INFECTION, UNSPECIFIED: ICD-10-CM

## 2019-07-29 NOTE — ED PROVIDER NOTE - GENITOURINARY NEGATIVE STATEMENT, MLM
no dysuria, no frequency, and no hematuria. Mastoid Interpolation Flap Text: A decision was made to reconstruct the defect utilizing an interpolation axial flap and a staged reconstruction.  A telfa template was made of the defect.  This telfa template was then used to outline the mastoid interpolation flap.  The donor area for the pedicle flap was then injected with anesthesia.  The flap was excised through the skin and subcutaneous tissue down to the layer of the underlying musculature.  The pedicle flap was carefully excised within this deep plane to maintain its blood supply.  The edges of the donor site were undermined.   The donor site was closed in a primary fashion.  The pedicle was then rotated into position and sutured.  Once the tube was sutured into place, adequate blood supply was confirmed with blanching and refill.  The pedicle was then wrapped with xeroform gauze and dressed appropriately with a telfa and gauze bandage to ensure continued blood supply and protect the attached pedicle.

## 2021-08-08 ENCOUNTER — EMERGENCY (EMERGENCY)
Facility: HOSPITAL | Age: 56
LOS: 0 days | Discharge: HOME | End: 2021-08-09
Attending: EMERGENCY MEDICINE | Admitting: EMERGENCY MEDICINE
Payer: MEDICARE

## 2021-08-08 VITALS
TEMPERATURE: 97 F | OXYGEN SATURATION: 99 % | DIASTOLIC BLOOD PRESSURE: 90 MMHG | SYSTOLIC BLOOD PRESSURE: 136 MMHG | HEART RATE: 65 BPM | RESPIRATION RATE: 18 BRPM | WEIGHT: 190.04 LBS | HEIGHT: 72 IN

## 2021-08-08 DIAGNOSIS — M54.2 CERVICALGIA: ICD-10-CM

## 2021-08-08 DIAGNOSIS — R07.89 OTHER CHEST PAIN: ICD-10-CM

## 2021-08-08 PROCEDURE — 93010 ELECTROCARDIOGRAM REPORT: CPT

## 2021-08-08 PROCEDURE — 99285 EMERGENCY DEPT VISIT HI MDM: CPT

## 2021-08-08 NOTE — ED ADULT TRIAGE NOTE - CHIEF COMPLAINT QUOTE
57 yo M BIBA for sudden onset chest/neck/left shoulder pain. patient states pain was worsened by walking up the stairs to his apartment. PMH of seizures, stroke. last seizure 1 day PTA.

## 2021-08-08 NOTE — ED ADULT NURSE NOTE - OBJECTIVE STATEMENT
Pt is c/o chest pains is radiating to his back, shoulder. Pt has hx seizure, last known episode was yesterday. pt is BIBA from home, says is difficult to ambulated in his apartment.

## 2021-08-09 PROBLEM — G40.909 EPILEPSY, UNSPECIFIED, NOT INTRACTABLE, WITHOUT STATUS EPILEPTICUS: Chronic | Status: ACTIVE | Noted: 2019-01-06

## 2021-08-09 LAB
ALBUMIN SERPL ELPH-MCNC: 4.4 G/DL — SIGNIFICANT CHANGE UP (ref 3.5–5.2)
ALP SERPL-CCNC: 48 U/L — SIGNIFICANT CHANGE UP (ref 30–115)
ALT FLD-CCNC: 16 U/L — SIGNIFICANT CHANGE UP (ref 0–41)
ANION GAP SERPL CALC-SCNC: 11 MMOL/L — SIGNIFICANT CHANGE UP (ref 7–14)
AST SERPL-CCNC: 28 U/L — SIGNIFICANT CHANGE UP (ref 0–41)
BASOPHILS # BLD AUTO: 0.02 K/UL — SIGNIFICANT CHANGE UP (ref 0–0.2)
BASOPHILS NFR BLD AUTO: 0.4 % — SIGNIFICANT CHANGE UP (ref 0–1)
BILIRUB SERPL-MCNC: 0.6 MG/DL — SIGNIFICANT CHANGE UP (ref 0.2–1.2)
BUN SERPL-MCNC: 14 MG/DL — SIGNIFICANT CHANGE UP (ref 10–20)
CALCIUM SERPL-MCNC: 8.8 MG/DL — SIGNIFICANT CHANGE UP (ref 8.5–10.1)
CHLORIDE SERPL-SCNC: 105 MMOL/L — SIGNIFICANT CHANGE UP (ref 98–110)
CO2 SERPL-SCNC: 26 MMOL/L — SIGNIFICANT CHANGE UP (ref 17–32)
CREAT SERPL-MCNC: 1.1 MG/DL — SIGNIFICANT CHANGE UP (ref 0.7–1.5)
EOSINOPHIL # BLD AUTO: 0.2 K/UL — SIGNIFICANT CHANGE UP (ref 0–0.7)
EOSINOPHIL NFR BLD AUTO: 3.8 % — SIGNIFICANT CHANGE UP (ref 0–8)
GLUCOSE SERPL-MCNC: 100 MG/DL — HIGH (ref 70–99)
HCT VFR BLD CALC: 38.5 % — LOW (ref 42–52)
HGB BLD-MCNC: 13.6 G/DL — LOW (ref 14–18)
IMM GRANULOCYTES NFR BLD AUTO: 0.4 % — HIGH (ref 0.1–0.3)
LIDOCAIN IGE QN: 32 U/L — SIGNIFICANT CHANGE UP (ref 7–60)
LYMPHOCYTES # BLD AUTO: 1.59 K/UL — SIGNIFICANT CHANGE UP (ref 1.2–3.4)
LYMPHOCYTES # BLD AUTO: 30.5 % — SIGNIFICANT CHANGE UP (ref 20.5–51.1)
MCHC RBC-ENTMCNC: 32.5 PG — HIGH (ref 27–31)
MCHC RBC-ENTMCNC: 35.3 G/DL — SIGNIFICANT CHANGE UP (ref 32–37)
MCV RBC AUTO: 91.9 FL — SIGNIFICANT CHANGE UP (ref 80–94)
MONOCYTES # BLD AUTO: 0.58 K/UL — SIGNIFICANT CHANGE UP (ref 0.1–0.6)
MONOCYTES NFR BLD AUTO: 11.1 % — HIGH (ref 1.7–9.3)
NEUTROPHILS # BLD AUTO: 2.8 K/UL — SIGNIFICANT CHANGE UP (ref 1.4–6.5)
NEUTROPHILS NFR BLD AUTO: 53.8 % — SIGNIFICANT CHANGE UP (ref 42.2–75.2)
NRBC # BLD: 0 /100 WBCS — SIGNIFICANT CHANGE UP (ref 0–0)
PLATELET # BLD AUTO: 177 K/UL — SIGNIFICANT CHANGE UP (ref 130–400)
POTASSIUM SERPL-MCNC: 4.7 MMOL/L — SIGNIFICANT CHANGE UP (ref 3.5–5)
POTASSIUM SERPL-SCNC: 4.7 MMOL/L — SIGNIFICANT CHANGE UP (ref 3.5–5)
PROT SERPL-MCNC: 7 G/DL — SIGNIFICANT CHANGE UP (ref 6–8)
RBC # BLD: 4.19 M/UL — LOW (ref 4.7–6.1)
RBC # FLD: 12.9 % — SIGNIFICANT CHANGE UP (ref 11.5–14.5)
SODIUM SERPL-SCNC: 142 MMOL/L — SIGNIFICANT CHANGE UP (ref 135–146)
TROPONIN T SERPL-MCNC: <0.01 NG/ML — SIGNIFICANT CHANGE UP
WBC # BLD: 5.21 K/UL — SIGNIFICANT CHANGE UP (ref 4.8–10.8)
WBC # FLD AUTO: 5.21 K/UL — SIGNIFICANT CHANGE UP (ref 4.8–10.8)

## 2021-08-09 PROCEDURE — 71045 X-RAY EXAM CHEST 1 VIEW: CPT | Mod: 26

## 2021-08-09 RX ORDER — KETOROLAC TROMETHAMINE 30 MG/ML
15 SYRINGE (ML) INJECTION ONCE
Refills: 0 | Status: DISCONTINUED | OUTPATIENT
Start: 2021-08-09 | End: 2021-08-09

## 2021-08-09 RX ADMIN — Medication 15 MILLIGRAM(S): at 01:29

## 2021-08-09 NOTE — ED PROVIDER NOTE - PHYSICAL EXAMINATION
PHYSICAL EXAM:    GENERAL: Alert, appears stated age, well appearing, non-toxic  SKIN: Warm, pink and dry. MMM.   HEAD: NC  EYE: Normal lids/conjunctiva  ENT: Normal hearing, patent oropharynx   NECK: +supple. No meningismus, or JVD  Pulm: Bilateral BS, normal resp effort, no wheezes, stridor, or retractions  CV: RRR, no M/R/G, 2+and = radial pulses  Abd: soft, non-tender, non-distended  Mskel: no erythema, cyanosis, edema. no calf tenderness. + paracervical TTP. +L Chest wall TTP.   Neuro: AAOx3 normal gait.

## 2021-08-09 NOTE — ED PROVIDER NOTE - CLINICAL SUMMARY MEDICAL DECISION MAKING FREE TEXT BOX
EKG non ischemic.  Troponin negative.  CCTA reassuring.  D/C home.  Strict return instructions discussed.

## 2021-08-09 NOTE — ED PROVIDER NOTE - ATTENDING CONTRIBUTION TO CARE
55 y/o male with hx of seizures on keppra, phenobarb, tegretol presents to ED with left sided CP radiating to left neck x 1 day.  Worse with movement and lifting.  No cough, fever or chills.  No numbness or tingling.  Patient had a CCTA with CAD RAD 0, Calcium 0 within the past 5 years.  PE:  agree with above.  A/P:  CP, doubt ACS.  Labs and Meds, EKG CXR and reassess.

## 2021-08-09 NOTE — ED PROVIDER NOTE - CARE PROVIDER_API CALL
Justin Ness  CARDIOVASCULAR DISEASE  [address] [facility] [address]  [place] [address]  Phone: [phone]  Fax: [phone]  Follow Up Time: 1-3 Days

## 2021-08-09 NOTE — ED PROVIDER NOTE - NSFOLLOWUPCLINICS_GEN_ALL_ED_FT
A Family Medicine Doctor  Family Medicine  .  [place]   Phone:   Fax:   Follow Up Time: 1-3 Days    [facility]  Neurology  [address] [place] [address], [facility] [address]  [place] [address]  Phone: [phone]  Fax:   Follow Up Time: 1-3 Days

## 2021-08-09 NOTE — ED PROVIDER NOTE - PATIENT PORTAL LINK FT
You can access the FollowMyHealth Patient Portal offered by [facility] by registering at the following website: [email or web address]. By joining [employer]’s FollowMyHealth portal, you will also be able to view your health information using other applications (apps) compatible with our system.

## 2021-08-09 NOTE — ED PROVIDER NOTE - OBJECTIVE STATEMENT
55 y/o M with PMH seizures keppra/phenobarb/tegratol, recent CCTA CADRAD 0, presents with moderate constant aching L-sided CP radiating to L neck x 1 hr PTA. +worse with movement, better with rest.   no n/v/d/ab pain/sob/diaphoresis/fall/trauma.  dad with MI in 50s.

## 2021-08-12 NOTE — ED ADULT NURSE NOTE - PAIN RATING/NUMBER SCALE (0-10): REST
4 What Is The Reason For Today's Visit?: Skin Lesions What Is The Reason For Today's Visit? (Being Monitored For X): the development of new lesions How Severe Are Your Spot(S)?: mild

## 2021-09-08 NOTE — PATIENT PROFILE ADULT - TOBACCO USE
Dapsone Pregnancy And Lactation Text: This medication is Pregnancy Category C and is not considered safe during pregnancy or breast feeding. Never smoker

## 2022-10-22 NOTE — ED PROVIDER NOTE - CADM POA CENTRAL LINE
IM Progress Note    Admit Date:  10/21/2022  1    Interval history:  abd pain, diverticulitis     Subjective:  Mr. Estela Galvan feels much improved. Pain controlled and no fevers or diarrhea or bleeding  Hungry and wishes to start diet    Objective:   /70   Pulse 70   Temp 98 °F (36.7 °C) (Oral)   Resp 16   Ht 5' 4\" (1.626 m)   Wt 160 lb (72.6 kg)   SpO2 91%   BMI 27.46 kg/m²   No intake or output data in the 24 hours ending 10/22/22 7822    Physical Exam:      General:  middle aged male, healthy appearing  Awake, alert and oriented. Appears to be not in any distress  Mucous Membranes:  Pink , anicteric  Neck: No JVD, no carotid bruit, no thyromegaly  Chest:  Clear to auscultation bilaterally, no added sounds  Cardiovascular:  RRR S1S2 heard, no murmurs or gallops  Abdomen:  Soft, undistended, left LQ tenderness improving but not gone , no organomegaly, BS present  Large tattoos noted  Extremities: No edema or cyanosis.  Distal pulses well felt  Neurological : grossly normal    Medications:   Scheduled Medications:    budesonide-formoterol  2 puff Inhalation BID    tiotropium  2 puff Inhalation Daily    sodium chloride flush  5-40 mL IntraVENous 2 times per day    enoxaparin  40 mg SubCUTAneous Daily    piperacillin-tazobactam  3,375 mg IntraVENous Q8H    amLODIPine  5 mg Oral Daily    atorvastatin  20 mg Oral Daily    folic acid  3,760 mcg Oral Daily    pantoprazole  40 mg Oral Daily    saccharomyces boulardii  250 mg Oral BID    ipratropium-albuterol  1 ampule Inhalation BID     I   sodium chloride      sodium chloride 100 mL/hr at 10/21/22 1623     albuterol sulfate HFA, sodium chloride flush, sodium chloride, potassium chloride **OR** potassium alternative oral replacement **OR** potassium chloride, magnesium sulfate, ondansetron **OR** ondansetron, polyethylene glycol, acetaminophen **OR** acetaminophen, hydrALAZINE, morphine **OR** morphine    Lab Data:  Recent Labs     10/21/22  1146 10/22/22  2068 WBC 9.5 11.0   HGB 14.5 12.8*   HCT 44.8 38.9*   MCV 95.4 94.2    238     Recent Labs     10/21/22  1016 10/21/22  1146 10/22/22  0527    136 138   K 4.4 4.0 4.2   CL 98* 99 101   CO2 31 32 31   BUN 13 12 11   CREATININE 1.2 1.1 1.0     No results for input(s): CKTOTAL, CKMB, CKMBINDEX, TROPONINI in the last 72 hours. Coagulation:   Lab Results   Component Value Date/Time    INR 0.95 08/15/2016 07:23 AM     Cardiac markers:   Lab Results   Component Value Date/Time    TROPONINI <0.01 08/18/2022 11:30 AM         Lab Results   Component Value Date    ALT 20 10/21/2022    AST 19 10/21/2022    ALKPHOS 100 10/21/2022    BILITOT 0.3 10/21/2022       Lab Results   Component Value Date    INR 0.95 08/15/2016    PROTIME 10.8 08/15/2016       Radiology  CULTURES  COVID: not detected     EKG:  I have reviewed the EKG with the following interpretation:   N/A     RADIOLOGY  CT ABDOMEN PELVIS W IV CONTRAST Additional Contrast? None   Preliminary Result   1. Findings most consistent with presumed recurrent sigmoid diverticulitis. Tiny bubbles of air adjacent to the diseased colon may represent air within   diverticula versus tiny foci of extraluminal air. Follow-up endoscopy and/or   CT examination after treatment recommended to document resolution of the   colonic findings. 2. No evidence of bowel obstruction, edwar intraperitoneal free air, or   abscess. MRI abdomen 9/9/22  1. Bosniak type 1 right renal cysts, no follow-up imaging is recommended. ASSESSMENT/PLAN:    Acute recurrent sigmoid  diverticulitis  -admitted to med-surg. GI and general surgery consulted  -NPO, ice chips, sips of clears  -IVF's. Zosyn D#1  -pain control: morphine prn  -improved pain and symptoms.  Can start diet  - surgery recommends sigmoid colectomy once acute issues resolved given recurrence      Hypertension  -BP elevated with pain ,added norvasc  improved now      Hyperlipidemia  -on Atorvastatin    COPD  -stable.  No AE  -continue Symbicort, Spiriva  -Albuterol, Duonebs prn    GERD  -on PPI     Folate deficiency   -cont folic acid    Renal cyst on right side - need f.w - d/w pt      DVT Prophylaxis: Lovenox  Diet: low fat diet  Full code    Laneta Gosselin, MD, 10/22/2022 7:52 AM No

## 2023-03-11 NOTE — ED PROVIDER NOTE - NPI NUMBER (FOR SYSADMIN USE ONLY) :
[[id number]]
Mildly to Moderately Impaired: difficulty hearing in some environments or speaker may need to increase volume or speak distinctly

## 2024-02-02 ENCOUNTER — EMERGENCY (EMERGENCY)
Facility: HOSPITAL | Age: 59
LOS: 0 days | Discharge: HOME | End: 2024-02-02
Attending: EMERGENCY MEDICINE
Payer: MEDICARE

## 2024-02-02 VITALS
HEIGHT: 71 IN | TEMPERATURE: 97 F | RESPIRATION RATE: 16 BRPM | DIASTOLIC BLOOD PRESSURE: 86 MMHG | WEIGHT: 201.94 LBS | HEART RATE: 78 BPM | SYSTOLIC BLOOD PRESSURE: 138 MMHG | OXYGEN SATURATION: 98 %

## 2024-02-02 DIAGNOSIS — K08.89 OTHER SPECIFIED DISORDERS OF TEETH AND SUPPORTING STRUCTURES: ICD-10-CM

## 2024-02-02 DIAGNOSIS — G40.909 EPILEPSY, UNSPECIFIED, NOT INTRACTABLE, WITHOUT STATUS EPILEPTICUS: ICD-10-CM

## 2024-02-02 PROCEDURE — 99283 EMERGENCY DEPT VISIT LOW MDM: CPT

## 2024-02-02 RX ORDER — IBUPROFEN 200 MG
600 TABLET ORAL ONCE
Refills: 0 | Status: COMPLETED | OUTPATIENT
Start: 2024-02-02 | End: 2024-02-02

## 2024-02-02 RX ADMIN — Medication 600 MILLIGRAM(S): at 11:36

## 2024-02-02 NOTE — ED PROVIDER NOTE - MDM ORDERS SUBMITTED SELECTION
Pediatric Otolaryngology and Facial Plastic Surgery  Dr. Frandy Lewis was seen today, 01/17/20,  in the [facility] Pediatric ENT and Facial Plastic Surgery Clinic.    Follow up plan: 2-4 weeks    Audiogram: None    Medications: None    Orders: None    Recommended Surgery: Right Cochlear implant     Diagnosis:SNHL  (H90.5), bilateral      Frandy Paulino MD   Pediatric Otolaryngology and Facial Plastic Surgery   Department of Otolaryngology   [facility]   Clinic [phone]    Argentina Scott RN   Patient Care Coordinator   Phone [phone]   Fax [phone]    Elizabeth Clinton   Perioperative Coordinator/Surgical Scheduling   Phone [phone]   Fax [phone]        [facility]  Frandy Paulino, *    Caring for Your Child after Cochlear Implant Surgery    What to expect after surgery:    Nausea    Dizziness    Tasting blood or coughing up a small amount of blood: This is normal.    Sore throat: Your child s throat may be scratchy from the breathing tube used during surgery.    Sore neck: Your child s neck may be sore because, during surgery, their head was turned to one side for an extended period of time.    Metal taste in the mouth: This may happen if the taste nerve was injured during surgery. The bad taste may last up to a couple of months.    Roaring in the ears or tinnitus: This is common and may go away with time.    Mild or generalized swelling: This will go down slowly over 2 months.    Numbness: Your child s ear will be numb. Gradually, feeling will return. Sometimes the very top of the ear remains numb.    Most of the effects of surgery should go away within one to two weeks. Some effects could be permanent.    Care after surgery:    Keep the head of bed up with 1-2 pillows (or use a folded blanket for infants) for about 1 week after surgery.     If glasses are worn, remove the bow on the implant side. This will avoid pressure near  the incision while it heals. Healing takes about 2 to 3 weeks.     Things to Avoid:    Do not blow your nose with force until your doctor says it is ok. Wait at least until your check up visit.     Do not lie flat in bed.    Pain/Medication:    If your child has pain, you may give Tylenol. Your doctor may also prescribe pain medicine. This medicine may cause constipation.    Your child should have a bowel movement within three days of surgery.  If not, ask your pharmacist about an over the counter stool softener.    Follow up:    If you have not received instructions about the CDC guidelines for pneumococcal vaccines in cochlear implant use (Prevnar, Pneumovax 13 or Pneumovax 23) contact your nurse coordinator at [phone]. This vaccine is recommended to help prevent pneumococcal meningitis in implant users.    ENT follow up in 3-4 weeks; should be previously scheduled.    Audiology follow up in 3-4 weeks; should be previously scheduled     Call clinic if ENT follow up and/or Audiology follow up have not been scheduled: [phone]    When to call us:    Clear fluid coming from your child s nose or the incision    Redness, pain or any drainage from the incision    Swelling of the wound (some generalized swelling is normal)    Pain that is uncontrolled with medication    A fever of 100 F (37.7 C) for 24 hours or longer    Severe dizziness or problems with balance    Sensitivity along the incision line due to the dissolvable stitches: if you notice dryness, crust or breakdown of any kind along the incision line, please call the clinic for instructions. In most cases, this will go away within 3-4 weeks.    Important Phone Numbers:  [facility]--Pediatric ENT Clinic    During office hours: [phone]    After hours: [phone] (ask to page the Pediatric ENT resident who is on-call)    Rev. 5/2018         Medications

## 2024-02-02 NOTE — ED PROVIDER NOTE - NSFOLLOWUPCLINICS_GEN_ALL_ED_FT
[facility]  Dental  [address] [place] [id number]  Phone: [phone]  Fax:   Follow Up Time: 1-3 Days

## 2024-02-02 NOTE — ED PROVIDER NOTE - ATTENDING CONTRIBUTION TO CARE
58-year-old male to ED with dental pain.  Patient complaining of persistent pain with inconsistent dental care.  No trauma or infection.  Does have a history of epilepsy.  Cracked back right molar so came in for evaluation.  Concerned that the pain will make him have a seizure.  Exam as noted

## 2024-02-02 NOTE — ED PROVIDER NOTE - PATIENT PORTAL LINK FT
You can access the FollowMyHealth Patient Portal offered by [facility] by registering at the following website: [email or web address]. By joining [employer]’s FollowMyHealth portal, you will also be able to view your health information using other applications (apps) compatible with our system.

## 2024-02-02 NOTE — ED PROVIDER NOTE - NSFOLLOWUPINSTRUCTIONS_ED_ALL_ED_FT
Dental Pain    Dental pain is often a sign that something is wrong with your teeth or gums. You can also have pain after a dental treatment. If you have dental pain, it is important to contact your dentist, especially if the cause of the pain is not known. Dental pain may hurt a lot or a little and can be caused by many things, including:  Tooth decay (cavities or caries).  Infection.  The inner part of the tooth being filled with pus (an abscess).  Injury.  A crack in the tooth.  Gums that move back and expose the root of a tooth.  Gum disease.  Abnormal grinding or clenching of teeth.  Not taking good care of your teeth.  Sometimes the cause of pain is not known.    You may have pain all the time, or it may happen only when you are:  Chewing.  Exposed to hot or cold temperatures.  Eating or drinking foods or drinks that have a lot of sugar in them, such as soda or candy.  Follow these instructions at home:  Medicines    Take over-the-counter and prescription medicines only as told by your dentist.  If you were prescribed an antibiotic medicine, take it as told by your dentist. Do not stop taking it even if you start to feel better.  Eating and drinking    Do not eat foods or drinks that cause you pain. These include:  Very hot or very cold foods or drinks.  Sweet or sugary foods or drinks.  Managing pain and swelling      If told, put ice on the painful area of your face. To do this:  Put ice in a plastic bag.  Place a towel between your skin and the bag.  Leave the ice on for 20 minutes, 2–3 times a day.  Take off the ice if your skin turns bright red. This is very important. If you cannot feel pain, heat, or cold, you have a greater risk of damage to the area.  Brushing your teeth    Brush your teeth twice a day using a fluoride toothpaste.  Use a toothpaste made for sensitive teeth as told by your dentist.  Use a soft toothbrush.  General instructions    Floss your teeth at least once a day.  Do not put heat on the outside of your face.  Rinse your mouth often with salt water. To make salt water, dissolve ½–1 tsp (3–6 g) of salt in 1 cup (237 mL) of warm water.  Watch your dental pain. Let your dentist know if there are any changes.  Keep all follow-up visits.  Contact a dentist if:  You have dental pain and you do not know why.  Medicine does not help your pain.  Your symptoms get worse.  You have new symptoms.  Get help right away if:  You cannot open your mouth.  You are having trouble breathing or swallowing.  You have a fever.  Your face, neck, or jaw is swollen.  These symptoms may be an emergency. Get help right away. Call your local emergency services (911 in the [place]  Do not wait to see if the symptoms will go away.  Do not drive yourself to the hospital.  Summary  Dental pain may be caused by many things, including tooth decay, injury, or infection. In some cases, the cause is not known.  Dental pain may hurt a lot or very little. You may have pain all the time, or you may have it only when you eat or drink.  Take over-the-counter and prescription medicines only as told by your dentist.  Watch your dental pain for any changes. Let your dentist know if symptoms get worse.  This information is not intended to replace advice given to you by your health care provider. Make sure you discuss any questions you have with your health care provider.    Document Revised: 09/22/2021 Document Reviewed: 09/22/2021

## 2024-02-02 NOTE — ED PROVIDER NOTE - OBJECTIVE STATEMENT
Pt is a 58 y.o Male with PMHx of Epilepsy follows Dr. Henderson who presents to the ED with chief complaint of dental pain. Pt states he cracked his back right molar about 2 weeks ago while chewing. Pt admits to top right molar tooth pain x2days. Denies any bleeding, discharge, fevers, chills, difficulty swallowing, speaking, breathing, trismus, CP, abdominal pain, n/v/d edema or rash. Pt states he has not taken anything for pain today. Pt states his tooth pain was so intense that is caused him to have a seizure yesterday. Pt states he usually has one seizure on average per week. Denies any Head trauma LOC or any other complaints at this time.

## 2024-02-02 NOTE — ED ADULT NURSE NOTE - PRO INTERPRETER NEED 2
Spoke with MD office, orders for meds per Meghan Deleon. Patient c/o of severe itching, states it is getting worse. No respiratory distress. No fever. Meds given; see MAR for details. Patient report feeling better and itching has improved. At 0857 platelets restarted very slowly.   English

## 2024-02-02 NOTE — ED PROVIDER NOTE - PHYSICAL EXAMINATION
CONSTITUTIONAL: NAD  SKIN: Warm dry  HEAD: NCAT  EYES: NL inspection  ENT: MMM, uvula midline oropharynx clear without any tonsilar exudates of hypertrophy, No pta. + ttp to tooth #1. no cracked or loose teeth noted. no bleeding or foreign objects noted  NECK: Supple; non tender.  CARD: RRR  RESP: CTAB  ABD: S/NT no R/G  BACK: nontender  EXT: no pedal edema  NEURO: Grossly unremarkable  PSYCH: Cooperative, appropriate.

## 2024-03-19 ENCOUNTER — INPATIENT (INPATIENT)
Facility: HOSPITAL | Age: 59
LOS: 1 days | Discharge: HOME | DRG: 101 | End: 2024-03-21
Attending: HOSPITALIST | Admitting: STUDENT IN AN ORGANIZED HEALTH CARE EDUCATION/TRAINING PROGRAM
Payer: MEDICARE

## 2024-03-19 VITALS
WEIGHT: 205.03 LBS | SYSTOLIC BLOOD PRESSURE: 156 MMHG | DIASTOLIC BLOOD PRESSURE: 100 MMHG | HEIGHT: 71 IN | HEART RATE: 66 BPM | TEMPERATURE: 99 F | OXYGEN SATURATION: 95 % | RESPIRATION RATE: 18 BRPM

## 2024-03-19 LAB
ALBUMIN SERPL ELPH-MCNC: 4.4 G/DL — SIGNIFICANT CHANGE UP (ref 3.5–5.2)
ALP SERPL-CCNC: 57 U/L — SIGNIFICANT CHANGE UP (ref 30–115)
ALT FLD-CCNC: 16 U/L — SIGNIFICANT CHANGE UP (ref 0–41)
ANION GAP SERPL CALC-SCNC: 10 MMOL/L — SIGNIFICANT CHANGE UP (ref 7–14)
AST SERPL-CCNC: 15 U/L — SIGNIFICANT CHANGE UP (ref 0–41)
BASOPHILS # BLD AUTO: 0.05 K/UL — SIGNIFICANT CHANGE UP (ref 0–0.2)
BASOPHILS NFR BLD AUTO: 1.1 % — HIGH (ref 0–1)
BILIRUB SERPL-MCNC: 0.5 MG/DL — SIGNIFICANT CHANGE UP (ref 0.2–1.2)
BUN SERPL-MCNC: 16 MG/DL — SIGNIFICANT CHANGE UP (ref 10–20)
CALCIUM SERPL-MCNC: 9.3 MG/DL — SIGNIFICANT CHANGE UP (ref 8.4–10.5)
CHLORIDE SERPL-SCNC: 107 MMOL/L — SIGNIFICANT CHANGE UP (ref 98–110)
CK SERPL-CCNC: 142 U/L — SIGNIFICANT CHANGE UP (ref 0–225)
CO2 SERPL-SCNC: 25 MMOL/L — SIGNIFICANT CHANGE UP (ref 17–32)
CREAT SERPL-MCNC: 1 MG/DL — SIGNIFICANT CHANGE UP (ref 0.7–1.5)
EGFR: 87 ML/MIN/1.73M2 — SIGNIFICANT CHANGE UP
EOSINOPHIL # BLD AUTO: 0.18 K/UL — SIGNIFICANT CHANGE UP (ref 0–0.7)
EOSINOPHIL NFR BLD AUTO: 3.9 % — SIGNIFICANT CHANGE UP (ref 0–8)
GLUCOSE SERPL-MCNC: 90 MG/DL — SIGNIFICANT CHANGE UP (ref 70–99)
HCT VFR BLD CALC: 38.6 % — LOW (ref 42–52)
HGB BLD-MCNC: 13.8 G/DL — LOW (ref 14–18)
IMM GRANULOCYTES NFR BLD AUTO: 0.2 % — SIGNIFICANT CHANGE UP (ref 0.1–0.3)
LYMPHOCYTES # BLD AUTO: 1.49 K/UL — SIGNIFICANT CHANGE UP (ref 1.2–3.4)
LYMPHOCYTES # BLD AUTO: 32 % — SIGNIFICANT CHANGE UP (ref 20.5–51.1)
MAGNESIUM SERPL-MCNC: 2 MG/DL — SIGNIFICANT CHANGE UP (ref 1.8–2.4)
MCHC RBC-ENTMCNC: 33.1 PG — HIGH (ref 27–31)
MCHC RBC-ENTMCNC: 35.8 G/DL — SIGNIFICANT CHANGE UP (ref 32–37)
MCV RBC AUTO: 92.6 FL — SIGNIFICANT CHANGE UP (ref 80–94)
MONOCYTES # BLD AUTO: 0.44 K/UL — SIGNIFICANT CHANGE UP (ref 0.1–0.6)
MONOCYTES NFR BLD AUTO: 9.5 % — HIGH (ref 1.7–9.3)
NEUTROPHILS # BLD AUTO: 2.48 K/UL — SIGNIFICANT CHANGE UP (ref 1.4–6.5)
NEUTROPHILS NFR BLD AUTO: 53.3 % — SIGNIFICANT CHANGE UP (ref 42.2–75.2)
NRBC # BLD: 0 /100 WBCS — SIGNIFICANT CHANGE UP (ref 0–0)
PHENOBARB SERPL-MCNC: <2.4 UG/ML — LOW (ref 15–40)
PLATELET # BLD AUTO: 198 K/UL — SIGNIFICANT CHANGE UP (ref 130–400)
PMV BLD: 11.2 FL — HIGH (ref 7.4–10.4)
POTASSIUM SERPL-MCNC: 4.1 MMOL/L — SIGNIFICANT CHANGE UP (ref 3.5–5)
POTASSIUM SERPL-SCNC: 4.1 MMOL/L — SIGNIFICANT CHANGE UP (ref 3.5–5)
PROT SERPL-MCNC: 7.1 G/DL — SIGNIFICANT CHANGE UP (ref 6–8)
RBC # BLD: 4.17 M/UL — LOW (ref 4.7–6.1)
RBC # FLD: 12.1 % — SIGNIFICANT CHANGE UP (ref 11.5–14.5)
SODIUM SERPL-SCNC: 142 MMOL/L — SIGNIFICANT CHANGE UP (ref 135–146)
TROPONIN T, HIGH SENSITIVITY RESULT: 8 NG/L — SIGNIFICANT CHANGE UP (ref 6–21)
VALPROATE SERPL-MCNC: <3 UG/ML — LOW (ref 50–100)
WBC # BLD: 4.65 K/UL — LOW (ref 4.8–10.8)
WBC # FLD AUTO: 4.65 K/UL — LOW (ref 4.8–10.8)

## 2024-03-19 PROCEDURE — 72125 CT NECK SPINE W/O DYE: CPT | Mod: 26,MC

## 2024-03-19 PROCEDURE — 71045 X-RAY EXAM CHEST 1 VIEW: CPT | Mod: 26

## 2024-03-19 PROCEDURE — 70450 CT HEAD/BRAIN W/O DYE: CPT | Mod: 26,MC

## 2024-03-19 PROCEDURE — 99285 EMERGENCY DEPT VISIT HI MDM: CPT

## 2024-03-19 PROCEDURE — 71250 CT THORAX DX C-: CPT | Mod: 26,MC

## 2024-03-19 PROCEDURE — 93010 ELECTROCARDIOGRAM REPORT: CPT

## 2024-03-19 PROCEDURE — 73030 X-RAY EXAM OF SHOULDER: CPT | Mod: 26,LT

## 2024-03-19 RX ORDER — SODIUM CHLORIDE 9 MG/ML
1000 INJECTION, SOLUTION INTRAVENOUS ONCE
Refills: 0 | Status: COMPLETED | OUTPATIENT
Start: 2024-03-19 | End: 2024-03-19

## 2024-03-19 NOTE — ED ADULT NURSE NOTE - NS_NURSE_RECEIVING_PHYS_ED_ALL_ED_FT
August 8, 2019       Ya Amaya MD  [address] [place] [address]  VIA In Basket      Patient: Sean Arellano   YOB: 2011   Date of Visit: 8/6/2019       Dear Dr. Amaya:    I saw your patient, Sean Arellano, for an evaluation. Below are my notes for this visit with him.    If you have questions, please do not hesitate to call me.      Sincerely,        Ariella Barrera MD        CC: No Recipients  Ariella Barrera MD  8/8/2019 11:05 AM  Sign at close encounter  Pediatric Cardiology Consultation    Referring Physician:  Ya Amaya MD    History of Present Illness:   Sean Arellano is a 7  Yrs 11  Mos male with a murmur, here for initial consultation.  He is accompanied by his mother.      By report of his mother and review of the available medical records, the patient was evaluated by a cardiologist at 1 years of age for a murmur.  At that time, the murmur was thought to be benign by auscultation and no cardiac testing was done.  Given persistence of the murmur, he has been referred for cardiology consultation.  There is no apparent history of neither chest pain nor palpitations nor shortness of breath nor tachypnea nor cyanosis nor exercise intolerance nor syncope nor poor growth.    Review of Systems   Constitutional: Negative for activity change, appetite change, diaphoresis, fatigue, fever, irritability and unexpected weight change.   HENT: Negative for congestion, dental problem, rhinorrhea, sneezing, sore throat, trouble swallowing and voice change.    Eyes: Negative for photophobia, discharge, redness and visual disturbance.   Respiratory: Negative for apnea, cough, choking, shortness of breath, wheezing and stridor.    Cardiovascular: Negative for chest pain, palpitations and leg swelling.   Gastrointestinal: Negative for abdominal distention, abdominal pain, blood in stool, constipation, diarrhea, nausea and vomiting.   Endocrine: Negative for cold intolerance, heat intolerance,  polydipsia, polyphagia and polyuria.   Genitourinary: Negative for decreased urine volume, difficulty urinating, dysuria and urgency.   Musculoskeletal: Negative for arthralgias, back pain, gait problem, joint swelling, myalgias, neck pain and neck stiffness.   Skin: Negative for color change, pallor and rash.   Allergic/Immunologic: Negative for immunocompromised state.   Neurological: Negative for tremors, seizures, facial asymmetry, speech difficulty, weakness, numbness and headaches.   Hematological: Negative for adenopathy. Does not bruise/bleed easily.   Psychiatric/Behavioral: Negative for agitation, behavioral problems, confusion, decreased concentration, dysphoric mood, hallucinations, self-injury, sleep disturbance and suicidal ideas. The patient is not nervous/anxious and is not hyperactive.        Patient Active Problem List   Diagnosis   • Heart murmur       Past Medical History:   Diagnosis Date   • No known problems        Past Surgical History:   Procedure Laterality Date   • No past surgeries         Family History   Adopted: Yes   Family history unknown: Yes      There is no known family history of congenital heart disease, cardiomyopathy, early MI, arrhythmia, LQTS, sudden death.    Social History     Social History Narrative    Lives at home with parents and 3 siblings.  Attends elementary school.       No current outpatient medications on file.     No current facility-administered medications for this visit.        ALLERGIES:  No Known Allergies    OBJECTIVE  Visit Vitals  /57 (BP Location: [facility], Patient Position: Sitting, Cuff Size: Small Adult)   Pulse 76   Ht 4' 5.15\" (1.35 m)   Wt 28.9 kg (63 lb 13.2 oz)   SpO2 100%   BMI 15.88 kg/m²     55 %ile (Z= 0.13) based on CDC (Boys, 2-20 Years) weight-for-age data using vitals from 8/6/2019.  62 %ile (Z= 0.30) based on CDC (Boys, 2-20 Years) Stature-for-age data based on Stature recorded on 8/6/2019.  44 %ile (Z= -0.14) based on CDC (Boys, 2-20  Years) BMI-for-age based on BMI available as of 8/6/2019.  Blood pressure percentiles are 77 % systolic and 40 % diastolic based on the August 2017 AAP Clinical Practice Guideline.   Physical Exam   Constitutional: He appears well-developed and well-nourished. He is active. No distress.   HENT:   Head: Normocephalic and atraumatic. No facial anomaly.   Right Ear: External ear normal.   Left Ear: External ear normal.   Nose: Nose normal. No nasal discharge.   Mouth/Throat: Mucous membranes are moist. Oropharynx is clear. Pharynx is normal.   Eyes: Pupils are equal, round, and reactive to light. Conjunctivae and EOM are normal. Right eye exhibits no discharge. Left eye exhibits no discharge.   Neck: Normal range of motion. Neck supple.   Cardiovascular: Normal rate, regular rhythm, S1 normal and S2 normal. Exam reveals no gallop and no friction rub. Pulses are palpable.   Murmur (2/6 soft systolic crescendo decrescendo murmur at left upper sternal border) heard.  Pulses:       Radial pulses are 2+ on the right side, and 2+ on the left side.        Femoral pulses are 2+ on the right side, and 2+ on the left side.       Dorsalis pedis pulses are 2+ on the right side, and 2+ on the left side.        Posterior tibial pulses are 2+ on the right side, and 2+ on the left side.   Pulmonary/Chest: Effort normal and breath sounds normal. There is normal air entry. No respiratory distress. He exhibits no tenderness, no deformity and no retraction.   Abdominal: Soft. Bowel sounds are normal. He exhibits no distension. There is no hepatosplenomegaly. There is no tenderness. There is no guarding.   Musculoskeletal: Normal range of motion. He exhibits no edema or deformity.   Neurological: He is alert. He displays normal reflexes. No cranial nerve deficit or sensory deficit. He exhibits normal muscle tone.   Skin: Skin is warm and moist. Capillary refill takes less than 2 seconds. He is not diaphoretic. No cyanosis. No jaundice or  pallor.       Relevant Testing:  ECG, tracing personally reviewed: Normal sinus rhythm at 71 bpm, nonspecific intraventricular conduction delay (QRS duration 104 ms).    Echocardiogram, images personally reviewed:   Mild pulmonary valve stenosis and mild insufficiency.  Peak gradient 23 mmHg and mean 11 mmHg.  Mild poststenotic dilation of the main pulmonary artery.  Normal size and flow velocities in the branch pulmonary arteries.  Normal valve structure and function.    Normal biventricular size and function, with no ventricular hypertrophy.      No pericardial or obvious pleural effusion.       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   I reviewed the nature of valvar pulmonary stenosis with his mother in detail, including drawing a diagram.  We discussed that for many children, the degree of stenosis remains mild or may resolve with growth.  His mother is aware that we will continue to monitor the valve function over time.      RECOMMENDATIONS:  1. Medications:  No new medications.     2. Diagnostic tests:  No further cardiac laboratory tests or imaging required today.  3. SBE prophylaxis:  Not required.  4. Immunizations:  Routine immunizations should be provided, including influenza.  Does not qualify for RSV prophylaxis.  5. Exercise restrictions:   Based on the available history and data, the patient appears at no greater risk than the general population for adverse cardiac events with exertion.  6. Surgical/Anesthesia Concerns:  Based on the available history and data, the patient is at no greater cardiac risk than the general population for surgery, anesthesia, or sedation.  Non-cardiac risk factors should be assessed by the PCP and relevant subspecialists.  7. Patient education:  To contact us for any new, concerning, or recurrent  symptoms.  8. Follow up:  A return visit to cardiology clinic is recommended in 2 years with ECG and echocardiogram, unless new or concerning symptoms develop.  Routine follow up with the primary doctor is recommended.    The patient's mother expressed understanding and agreement with the above recommendations.  All questions were answered.    I spent 60 minutes of face-to-face time with the patient, >50% of which was for education and counseling.    Ariella Barrera MD  Pediatric Cardiology  [facility]                MD Yong Jade

## 2024-03-19 NOTE — ED PROVIDER NOTE - CLINICAL SUMMARY MEDICAL DECISION MAKING FREE TEXT BOX
multiple sz ? fall  no detectable AED on levels, tho pt sts hes compliant.    labs, imagign done, neuro cnsulted and rec epilepsy unit for further are.  AEDs given in ED.

## 2024-03-19 NOTE — ED PROVIDER NOTE - CARE PLAN
Assessment and plan of treatment:	Seizure/fall  CT scans labs neuro consult monitoring supportive care will reassess   1 Principal Discharge DX:	Seizures  Assessment and plan of treatment:	Seizure/fall  CT scans labs neuro consult monitoring supportive care will reassess

## 2024-03-19 NOTE — ED ADULT NURSE NOTE - OBJECTIVE STATEMENT
Pt BIBA for seizures. Pt is A/Ox4 and states he was home alone and had a seizure. Pt reports niece found him on the floor. Pt states he takes keppra and phenobarbital for his seizures and he has taken all his meds. Pt is also complaining of left neck and shoulder pain. Limited ROM to left shoulder. Pt is able move neck and other extremities. + bilateral radial pulses.

## 2024-03-19 NOTE — ED PROVIDER NOTE - OBJECTIVE STATEMENT
58-year-old male with past medical history of seizures on phenobarbital, Keppra, Depakote presenting today after having 2 seizures today and fell to the ground.  Patient states that he is compliant with taking his medications.  States that he is now having left-sided rib pain/chest wall pain, left shoulder pain, neck pain and headache. No sob, fever, chills, abdominal pain, nausea, vomiting, diarrhea, back pain, urinary symptoms, dizziness, paresthesias, or weakness.

## 2024-03-19 NOTE — ED PROVIDER NOTE - PLAN OF CARE
[de-identified] : Patient is full passive internal/external rotation of both hips no restriction mechanical block or pain.  Neurovascular intact distally. Seizure/fall  CT scans labs neuro consult monitoring supportive care will reassess

## 2024-03-19 NOTE — ED PROVIDER NOTE - PHYSICAL EXAMINATION
VITAL SIGNS: I have reviewed nursing notes and confirm.  CONSTITUTIONAL: Patient is in no acute distress.  SKIN: Skin exam is warm and dry, no acute rash.  HEAD: Normocephalic; atraumatic.  EYES: PERRL, EOM intact; conjunctiva and sclera clear.  ENT: No nasal discharge; airway clear.   NECK: Supple; Mild tenderness to the bilateral paraspinal of neck.  No midline tenderness.  CARD: S1, S2 normal; no murmurs, gallops, or rubs. Regular rate and rhythm.  RESP: Clear to auscultation bilaterally. No wheezes, rales or rhonchi. Left chest wall tenderness.  ABD: Normal bowel sounds; soft; non-distended; non-tender.   EXT: Normal ROM. No edema. Tenderness to left shoulder with painful range of motion however full passive range of motion.  LYMPH: No acute cervical adenopathy.  NEURO: Alert, oriented. Grossly unremarkable. No focal deficits.  PSYCH: Cooperative, appropriate.

## 2024-03-19 NOTE — ED ADULT TRIAGE NOTE - CHIEF COMPLAINT QUOTE
He has a history of seizures, had a seizure at home, he has pain on the back of his head, his shoulders, he has pain in his chest also. He was post ictal at first, but he's better now  - EMS   patient reports he is compliant with Keppra, reports he can have multiple seizure in a day, last seizure toxday at 2 PM

## 2024-03-19 NOTE — ED PROVIDER NOTE - ATTENDING APP SHARED VISIT CONTRIBUTION OF CARE
Patient presents status post seizure patient patient states between 2 and 3 in the afternoon he had a seizure and he fell to the ground.  States his niece found him and called 911 this evening.  Patient complaining of left-sided rib pain left shoulder pain headache and neck pain.  Patient states he is on Keppra phenobarbital and Depakote and is compliant takes them daily.  Patient states he had a seizure earlier today as well.  Patient states he typically has 2-3 seizures a week.  Denies lack of sleep fever nausea vomiting decreased p.o. intake shortness of breath abdominal pain nausea vomit diarrhea.    VITAL SIGNS: I have reviewed nursing notes and confirm.  CONSTITUTIONAL: Well-developed; well-nourished; in no acute distress.  SKIN: Skin exam is warm and dry, no acute rash.  HEAD: Normocephalic; atraumatic.  EYES: PERRL, EOM intact; conjunctiva and sclera clear.  ENT: No nasal discharge; airway clear.   NECK: Supple; non tender.  CARD:+ S1, S2 tender over left upper chest/ribs.  RESP: No wheezes, rales or rhonchi.  ABD: Normal bowel sounds; soft; non-distended; non-tender;  EXT: Normal ROM. No cyanosis or edema. tender over L shoulder, dec Rom 2/2 pain.   LYMPH: No acute adenopathy.  NEURO: Alert. Grossly unremarkable. No focal deficits.  PSYCH: Cooperative, appropriate.

## 2024-03-19 NOTE — ED ADULT NURSE NOTE - NSFALLHARMRISKINTERV_ED_ALL_ED

## 2024-03-20 DIAGNOSIS — R56.9 UNSPECIFIED CONVULSIONS: ICD-10-CM

## 2024-03-20 LAB
ALBUMIN SERPL ELPH-MCNC: 4.3 G/DL — SIGNIFICANT CHANGE UP (ref 3.5–5.2)
ALP SERPL-CCNC: 55 U/L — SIGNIFICANT CHANGE UP (ref 30–115)
ALT FLD-CCNC: 14 U/L — SIGNIFICANT CHANGE UP (ref 0–41)
AMPHET UR-MCNC: NEGATIVE — SIGNIFICANT CHANGE UP
ANION GAP SERPL CALC-SCNC: 9 MMOL/L — SIGNIFICANT CHANGE UP (ref 7–14)
APPEARANCE UR: CLEAR — SIGNIFICANT CHANGE UP
AST SERPL-CCNC: 15 U/L — SIGNIFICANT CHANGE UP (ref 0–41)
BARBITURATES UR SCN-MCNC: POSITIVE
BENZODIAZ UR-MCNC: NEGATIVE — SIGNIFICANT CHANGE UP
BILIRUB SERPL-MCNC: 0.5 MG/DL — SIGNIFICANT CHANGE UP (ref 0.2–1.2)
BILIRUB UR-MCNC: NEGATIVE — SIGNIFICANT CHANGE UP
BUN SERPL-MCNC: 12 MG/DL — SIGNIFICANT CHANGE UP (ref 10–20)
CALCIUM SERPL-MCNC: 9.2 MG/DL — SIGNIFICANT CHANGE UP (ref 8.4–10.5)
CHLORIDE SERPL-SCNC: 108 MMOL/L — SIGNIFICANT CHANGE UP (ref 98–110)
CO2 SERPL-SCNC: 24 MMOL/L — SIGNIFICANT CHANGE UP (ref 17–32)
COCAINE METAB.OTHER UR-MCNC: NEGATIVE — SIGNIFICANT CHANGE UP
COLOR SPEC: YELLOW — SIGNIFICANT CHANGE UP
CREAT SERPL-MCNC: 1 MG/DL — SIGNIFICANT CHANGE UP (ref 0.7–1.5)
DIFF PNL FLD: NEGATIVE — SIGNIFICANT CHANGE UP
DRUG SCREEN 1, URINE RESULT: SIGNIFICANT CHANGE UP
EGFR: 87 ML/MIN/1.73M2 — SIGNIFICANT CHANGE UP
FENTANYL UR QL: NEGATIVE — SIGNIFICANT CHANGE UP
GLUCOSE SERPL-MCNC: 107 MG/DL — HIGH (ref 70–99)
GLUCOSE UR QL: NEGATIVE MG/DL — SIGNIFICANT CHANGE UP
HCT VFR BLD CALC: 39.1 % — LOW (ref 42–52)
HGB BLD-MCNC: 14.2 G/DL — SIGNIFICANT CHANGE UP (ref 14–18)
KETONES UR-MCNC: NEGATIVE MG/DL — SIGNIFICANT CHANGE UP
LACTATE SERPL-SCNC: 1.1 MMOL/L — SIGNIFICANT CHANGE UP (ref 0.7–2)
LEUKOCYTE ESTERASE UR-ACNC: NEGATIVE — SIGNIFICANT CHANGE UP
MCHC RBC-ENTMCNC: 33.3 PG — HIGH (ref 27–31)
MCHC RBC-ENTMCNC: 36.3 G/DL — SIGNIFICANT CHANGE UP (ref 32–37)
MCV RBC AUTO: 91.8 FL — SIGNIFICANT CHANGE UP (ref 80–94)
METHADONE UR-MCNC: NEGATIVE — SIGNIFICANT CHANGE UP
NITRITE UR-MCNC: NEGATIVE — SIGNIFICANT CHANGE UP
NRBC # BLD: 0 /100 WBCS — SIGNIFICANT CHANGE UP (ref 0–0)
OPIATES UR-MCNC: NEGATIVE — SIGNIFICANT CHANGE UP
OXYCODONE UR-MCNC: NEGATIVE — SIGNIFICANT CHANGE UP
PCP UR-MCNC: NEGATIVE — SIGNIFICANT CHANGE UP
PH UR: 7.5 — SIGNIFICANT CHANGE UP (ref 5–8)
PLATELET # BLD AUTO: 174 K/UL — SIGNIFICANT CHANGE UP (ref 130–400)
PMV BLD: 10.7 FL — HIGH (ref 7.4–10.4)
POTASSIUM SERPL-MCNC: 3.9 MMOL/L — SIGNIFICANT CHANGE UP (ref 3.5–5)
POTASSIUM SERPL-SCNC: 3.9 MMOL/L — SIGNIFICANT CHANGE UP (ref 3.5–5)
PROPOXYPHENE QUALITATIVE URINE RESULT: NEGATIVE — SIGNIFICANT CHANGE UP
PROT SERPL-MCNC: 6.7 G/DL — SIGNIFICANT CHANGE UP (ref 6–8)
PROT UR-MCNC: NEGATIVE MG/DL — SIGNIFICANT CHANGE UP
RBC # BLD: 4.26 M/UL — LOW (ref 4.7–6.1)
RBC # FLD: 12.1 % — SIGNIFICANT CHANGE UP (ref 11.5–14.5)
SODIUM SERPL-SCNC: 141 MMOL/L — SIGNIFICANT CHANGE UP (ref 135–146)
SP GR SPEC: 1.01 — SIGNIFICANT CHANGE UP (ref 1–1.03)
THC UR QL: NEGATIVE — SIGNIFICANT CHANGE UP
UROBILINOGEN FLD QL: 1 MG/DL — SIGNIFICANT CHANGE UP (ref 0.2–1)
WBC # BLD: 4.12 K/UL — LOW (ref 4.8–10.8)
WBC # FLD AUTO: 4.12 K/UL — LOW (ref 4.8–10.8)

## 2024-03-20 PROCEDURE — 36415 COLL VENOUS BLD VENIPUNCTURE: CPT

## 2024-03-20 PROCEDURE — 99222 1ST HOSP IP/OBS MODERATE 55: CPT

## 2024-03-20 PROCEDURE — 80053 COMPREHEN METABOLIC PANEL: CPT

## 2024-03-20 PROCEDURE — 85027 COMPLETE CBC AUTOMATED: CPT

## 2024-03-20 PROCEDURE — 95700 EEG CONT REC W/VID EEG TECH: CPT

## 2024-03-20 PROCEDURE — 95716 VEEG EA 12-26HR CONT MNTR: CPT

## 2024-03-20 RX ORDER — DIVALPROEX SODIUM 500 MG/1
500 TABLET, DELAYED RELEASE ORAL THREE TIMES A DAY
Refills: 0 | Status: DISCONTINUED | OUTPATIENT
Start: 2024-03-20 | End: 2024-03-21

## 2024-03-20 RX ORDER — LEVETIRACETAM 250 MG/1
500 TABLET, FILM COATED ORAL ONCE
Refills: 0 | Status: DISCONTINUED | OUTPATIENT
Start: 2024-03-20 | End: 2024-03-20

## 2024-03-20 RX ORDER — LEVETIRACETAM 250 MG/1
500 TABLET, FILM COATED ORAL
Refills: 0 | Status: DISCONTINUED | OUTPATIENT
Start: 2024-03-20 | End: 2024-03-20

## 2024-03-20 RX ORDER — ENOXAPARIN SODIUM 100 MG/ML
40 INJECTION SUBCUTANEOUS EVERY 24 HOURS
Refills: 0 | Status: DISCONTINUED | OUTPATIENT
Start: 2024-03-20 | End: 2024-03-21

## 2024-03-20 RX ORDER — PHENOBARBITAL 60 MG
32.4 TABLET ORAL ONCE
Refills: 0 | Status: DISCONTINUED | OUTPATIENT
Start: 2024-03-20 | End: 2024-03-20

## 2024-03-20 RX ORDER — LEVETIRACETAM 250 MG/1
500 TABLET, FILM COATED ORAL EVERY 12 HOURS
Refills: 0 | Status: DISCONTINUED | OUTPATIENT
Start: 2024-03-20 | End: 2024-03-21

## 2024-03-20 RX ORDER — INFLUENZA VIRUS VACCINE 15; 15; 15; 15 UG/.5ML; UG/.5ML; UG/.5ML; UG/.5ML
0.5 SUSPENSION INTRAMUSCULAR ONCE
Refills: 0 | Status: DISCONTINUED | OUTPATIENT
Start: 2024-03-20 | End: 2024-03-21

## 2024-03-20 RX ORDER — PHENOBARBITAL 60 MG
32.4 TABLET ORAL
Refills: 0 | Status: DISCONTINUED | OUTPATIENT
Start: 2024-03-20 | End: 2024-03-20

## 2024-03-20 RX ORDER — DIVALPROEX SODIUM 500 MG/1
500 TABLET, DELAYED RELEASE ORAL ONCE
Refills: 0 | Status: DISCONTINUED | OUTPATIENT
Start: 2024-03-20 | End: 2024-03-20

## 2024-03-20 RX ADMIN — DIVALPROEX SODIUM 500 MILLIGRAM(S): 500 TABLET, DELAYED RELEASE ORAL at 21:39

## 2024-03-20 RX ADMIN — LEVETIRACETAM 500 MILLIGRAM(S): 250 TABLET, FILM COATED ORAL at 21:39

## 2024-03-20 RX ADMIN — LEVETIRACETAM 500 MILLIGRAM(S): 250 TABLET, FILM COATED ORAL at 06:13

## 2024-03-20 RX ADMIN — DIVALPROEX SODIUM 500 MILLIGRAM(S): 500 TABLET, DELAYED RELEASE ORAL at 06:11

## 2024-03-20 RX ADMIN — DIVALPROEX SODIUM 500 MILLIGRAM(S): 500 TABLET, DELAYED RELEASE ORAL at 02:04

## 2024-03-20 RX ADMIN — DIVALPROEX SODIUM 500 MILLIGRAM(S): 500 TABLET, DELAYED RELEASE ORAL at 14:00

## 2024-03-20 RX ADMIN — Medication 32.4 MILLIGRAM(S): at 02:01

## 2024-03-20 RX ADMIN — LEVETIRACETAM 500 MILLIGRAM(S): 250 TABLET, FILM COATED ORAL at 02:05

## 2024-03-20 NOTE — CONSULT NOTE ADULT - SUBJECTIVE AND OBJECTIVE BOX
Neurology Consult Note    HPI: 58-year-old male with past medical history of primary generalized seizure d/o since infancy on phenobarbital, Keppra, and Depakote presenting today after having 2 seizures, hours apart and back to baseline with each. With last seizure, pt fell to the ground, injuring his left shoulder in which the pt states he developed chest pain and came to the ED. Patient states that he not always compliant with taking his medications, misses a couple doses a week, his medications are filled by his PCP and he has not followed with a neurologist since 2019. CTH negative per ED.     PAST MEDICAL & SURGICAL HISTORY:  Seizure disorder          FAMILY HISTORY:      SOCIAL HISTORY:  Denies smoking, drinking, or drug use    ROS:  Constitutional: No fever, weight loss or fatigue  Eyes: No eye pain, visual disturbances, or discharge  ENMT:  No difficulty hearing, tinnitus, vertigo;  No sinus or throat pain  Neck: No pain or stiffness  Respiratory: No cough, wheezing, chills or hemoptysis  Cardiovascular: No chest pain, palpitations, shortness of breath, dizziness or leg swelling  Gastrointestinal: No abdominal pain. No nausea, vomiting or hematemesis; No diarrhea or constipation. Nohematochezia.  Genitourinary: No dysuria, frequency, hematuria or incontinence  Neurological: As per HPI  Skin: No itching, burning, rashes or lesions   Endocrine: No heat or cold intolerance; No hair loss  Musculoskeletal: No joint pain or swelling; No muscle, back or extremity pain  Psychiatric: No depression, anxiety, mood swings or difficulty sleeping  Heme/Lymph: No easy bruising or bleeding gums    MEDICATIONS  (STANDING):  lactated ringers Bolus 1000 milliLiter(s) IV Bolus once    MEDICATIONS  (PRN):      Allergies    No Known Allergies    Intolerances        Vital Signs Last 24 Hrs  T(C): 37.1 (19 Mar 2024 21:34), Max: 37.1 (19 Mar 2024 21:34)  T(F): 98.8 (19 Mar 2024 21:34), Max: 98.8 (19 Mar 2024 21:34)  HR: 66 (19 Mar 2024 21:34) (66 - 66)  BP: 156/100 (19 Mar 2024 21:34) (156/100 - 156/100)  BP(mean): --  RR: 18 (19 Mar 2024 21:34) (18 - 18)  SpO2: 95% (19 Mar 2024 21:34) (95% - 95%)    Parameters below as of 19 Mar 2024 21:34  Patient On (Oxygen Delivery Method): room air        Physical exam:  General: No acute distress, awake and alert  Cardiovascular: Regular rate and rhythm, no murmurs, rubs, or gallops. No bruits  Pulmonary: Anterior breath sounds clear bilaterally, no crackles or wheezing. No use of accessory muscles  Extremities: Radial and DP pulses +2, no edema    Neurologic:  -Mental status: Awake, alert, oriented to person, place, and time. Speech is fluent with intact naming, repetition, and comprehension, no dysarthria. Recent and remote memory intact. Follows commands. Attention/concentration intact. Fund of knowledge appropriate.  -Cranial nerves:   II: Visual fields are full to confrontation.  III, IV, VI: Extraocular movements are intact without nystagmus. Pupils equally round and reactive to light  V:  Facial sensation V1-V3 equal and intact   VII: Face is symmetric with normal eye closure and smile  VIII: Hearing is bilaterally intact to finger rub  IX, X: Uvula is midline and soft palate rises symmetrically  XI: Head turning and shoulder shrug are intact.  XII: Tongue protrudes midline  Motor: Normal bulk and tone. No pronator drift. Strength bilateral upper extremity 5/5, bilateral lower extremities 5/5.  Rapid alternating movements intact and symmetric  Sensation: Intact to light touch bilaterally. No neglect or extinction on double simultaneous testing.  Coordination: No dysmetria on finger-to-nose and heel-to-shin bilaterally  Reflexes: Downgoing toes bilaterally   Gait: Narrow gait and steady    NIHSS: ***  ICH: ****  GCS: ****    LABS:                        13.8   4.65  )-----------( 198      ( 19 Mar 2024 22:51 )             38.6     03-    142  |  107  |  16  ----------------------------<  90  4.1   |  25  |  1.0    Ca    9.3      19 Mar 2024 22:51  Mg     2.0         TPro  7.1  /  Alb  4.4  /  TBili  0.5  /  DBili  x   /  AST  15  /  ALT  16  /  AlkPhos  57        Urinalysis Basic - ( 20 Mar 2024 00:35 )    Color: Yellow / Appearance: Clear / S.011 / pH: x  Gluc: x / Ketone: Negative mg/dL  / Bili: Negative / Urobili: 1.0 mg/dL   Blood: x / Protein: Negative mg/dL / Nitrite: Negative   Leuk Esterase: Negative / RBC: x / WBC x   Sq Epi: x / Non Sq Epi: x / Bacteria: x        RADIOLOGY & ADDITIONAL TESTS:      Assessment and Plan  58y Male    Neuro  - Repeat CT head with any acute change in mental status.   - Keep temp <100F and maintain glucose 140-180.   - consider ASA 81mg and atorvastatin 80mg ***  - MRI brain w/ and w/out ***    Cardio  - Goal BP <180/105  - Notify MD if HR >100 or <55  - Echo with bubble***, may need CARLITA    DVT Prophylaxis:   - Bilateral SCDs   - subq heparin if 24hr s/p tpA HCT clear    Zandra Light  Neurology Stroke NP  [phone]   Neurology Consult Note    HPI: 58-year-old male with past medical history of primary generalized seizure d/o since infancy on phenobarbital, Keppra, and Depakote presenting today after having 2 seizures, hours apart and back to baseline with each. With last seizure, pt fell to the ground, unsure of HT, injuring his left shoulder in which the pt states he developed chest pain and came to the ED. Patient states that he not always compliant with taking his medications, misses a couple doses a week, his medications are filled by his PCP and he has not followed with a neurologist since 2019. Pt states the seizures happen to him at random times of the day, with no specific triggers. Denies recent illness, focal weakness, confusion, vision changes, headache, etc.     PAST MEDICAL & SURGICAL HISTORY:  Seizure disorder          FAMILY HISTORY:      SOCIAL HISTORY:  Denies smoking, drinking, or drug use    ROS: as per HPI    MEDICATIONS  (STANDING):  lactated ringers Bolus 1000 milliLiter(s) IV Bolus once    MEDICATIONS  (PRN):      Allergies    No Known Allergies    Intolerances        Vital Signs Last 24 Hrs  T(C): 37.1 (19 Mar 2024 21:34), Max: 37.1 (19 Mar 2024 21:34)  T(F): 98.8 (19 Mar 2024 21:34), Max: 98.8 (19 Mar 2024 21:34)  HR: 66 (19 Mar 2024 21:34) (66 - 66)  BP: 156/100 (19 Mar 2024 21:34) (156/100 - 156/100)  BP(mean): --  RR: 18 (19 Mar 2024 21:34) (18 - 18)  SpO2: 95% (19 Mar 2024 21:34) (95% - 95%)    Parameters below as of 19 Mar 2024 21:34  Patient On (Oxygen Delivery Method): room air        Physical exam:  General: No acute distress, lethargic, awake and alert    Neurologic:  -Mental status: Awake, alert, oriented to person, place, and time. Speech is fluent with intact naming, repetition, and comprehension, no dysarthria. Recent and remote memory intact. Follows commands. Attention/concentration intact. Fund of knowledge appropriate.  -Cranial nerves:   II: Visual fields are full to confrontation.  III, IV, VI: Extraocular movements are intact without nystagmus. Pupils equally round and reactive to light  V:  Facial sensation V1-V3 equal and intact   VII: Face is symmetric with normal eye closure and smile  XI: Head turning and shoulder shrug are intact.  XII: Tongue protrudes midline  Motor: Normal bulk and tone. Pain limited exam, pt refusing to move LUE due to fall today and states baseline weakness in RLE due to trauma years ago.   Sensation: Intact to light touch bilaterally.  Coordination: No dysmetria on finger-to-nose and heel-to-shin bilaterally    LABS:                        13.8   4.65  )-----------( 198      ( 19 Mar 2024 22:51 )             38.6         142  |  107  |  16  ----------------------------<  90  4.1   |  25  |  1.0    Ca    9.3      19 Mar 2024 22:51  Mg     2.0         TPro  7.1  /  Alb  4.4  /  TBili  0.5  /  DBili  x   /  AST  15  /  ALT  16  /  AlkPhos  57        Urinalysis Basic - ( 20 Mar 2024 00:35 )    Color: Yellow / Appearance: Clear / S.011 / pH: x  Gluc: x / Ketone: Negative mg/dL  / Bili: Negative / Urobili: 1.0 mg/dL   Blood: x / Protein: Negative mg/dL / Nitrite: Negative   Leuk Esterase: Negative / RBC: x / WBC x   Sq Epi: x / Non Sq Epi: x / Bacteria: x    Lactate, Blood: 1.1 mmol/L (24 @ 23:27)  Valproic Acid Level, Serum: <3.0 ug/mL (24 @ 22:51)  Magnesium: 2.0 mg/dL (24 @ 22:51)   Phenobarbital Level, Serum: <2.4 ug/mL (24 @ 22:51)   RADIOLOGY & ADDITIONAL TESTS:  < from: CT Head No Cont (24 @ 23:27) >    IMPRESSION:    CT HEAD:  1.  No evidence of acute intracranial pathology.    CT CERVICAL SPINE:  1.  No evidence of acute cervical spine traumatic injury.    < end of copied text >

## 2024-03-20 NOTE — PATIENT PROFILE ADULT - FUNCTIONAL ASSESSMENT - BASIC MOBILITY 6.
(2) well flexed  4-calculated by average/Not able to assess (calculate score using [facility] averaging method)

## 2024-03-20 NOTE — CONSULT NOTE ADULT - ASSESSMENT
Assessment and Plan  58-year-old male with past medical history of primary generalized seizure d/o since infancy on phenobarbital, Keppra, and Depakote presenting today after having 2 seizures, hours apart and back to baseline with each, however presenting to ED s/p trauma to left shoulder from todays seizure. Pt does not follow with neurologist, endorses inconsistent compliance with medications, and states his seizures are as frequent as 2-3 times a week. Exam today insignificant for focal deficits, however pain limited exam due to LUE trauma and baseline RLE pain, pt lethargic at this time but axox4. CTH ordered by ED negative, lactate+mag WNLs, depakote + phenobarbital levels nontherapeutic, no signs of infection.     Impression: 57y/o M with hx of primary generalized seizures up to 2-3 times a week, since birth. Due to noncompliance with medication and frequency of seizures, further work up recommended at this time.     RECS:  -Admit/transfer to [facility] for vEEG  -Continue home AEDs for now: keppra 500mg TID, depakote 500mg TID, phenobarbital 32.4 mg BID.  -Obtain depakote, keppra, and phenobarbital levels.   -Seizure precautions   -Keep Mg>2    Discussed with Dr. Verdugo

## 2024-03-20 NOTE — CHART NOTE - NSCHARTNOTEFT_GEN_A_CORE
Epilepsy NP:    Patient was transferred to [facility] from [facility] for VEEG after presenting for cluster of seizures associated with fall.  Patient states he is not following up with neurologist, his medications are prescribed by PMD. He continues having 2-3 seizures per week, some associated with falls.  Patient reports being prescribed 3 seizure medications that he misses occasionally, but takes as prescribed majority of the time. Depakote and Phenobarbital levels upon arrival were not detectable.  He reports taking currently  Keppra 500mg 3 times per day  Phenobarbital 32.4mg twice daily  Depakote 500mg 3 times per day.    Patient states he is getting  on Saturday, March 23. He reports being used to having seizures frequently that started in his first year of life. Seizures do not affect his mood or lifestyle since he knows they cannot be controlled.  He agrees to have VEEG, but in case of discomfort may reconsider and take electrodes off. He insists on being discharged no later than tomorrow morning.    [facility] pharmacy contacted. Patient is currently prescribed:  Keppra 500mg q12hrs  Phenobarbital 32.4mg q12hrs  Depakote 500mg q8hrs  Lipitor 20mg QHS  All medications were filled 1/8/2024 with 90 day supply, prescribed Dr. Henderson.    EMR reviewed.  Patient was admitted to EMU in January 2019 after presenting to ED with multiple health concerns, including frequent seizures. History was not very clear and inconsistent, medications could not be verified with pharmacies, [place] s[place].  VEEG was normal x 24 hrs, patient was recommended to continue monitoring for better characterization and treatment plan, but he but patient refused.  Patient was discharged home on 2 medications (phenobarbital and Keppra).    Assessment:  Considering reported history of poorly controlled seizures that are not well characterized and associated with frequent falls, patient requires VEEG monitoring to optimize medications and characterized the syndrome.   Patient is currently prescribed 3 seizure medications by PMD. Depakote and Phenobarbital levels upon arrival were not detectable.    Plan:  Transfer to EMU  Seizure precautions  Ativan 2mg IV PRN for generalized tonic-clonic seizure lasting longer than 2 minutes, or two consecutive seizures without return to baseline in-between  Continue seizure medications as verified with outside pharmacy for now   Discussed with patient at length, all questions answered.  Patient signed consent for VEEG monitoring    Plan discussed with hospitalist and nursing team. Epilepsy NP:    Patient was transferred to [facility] from [facility] for VEEG after presenting for cluster of seizures associated with fall.  Patient states he is not following up with neurologist, his medications are prescribed by PMD. He reports having 2-3 seizures per week, some associated with falls.  Patient reports being prescribed 3 seizure medications that he misses occasionally, but takes as prescribed majority of the time. Depakote and Phenobarbital levels upon arrival were not detectable.  He reports taking currently:  Keppra 500mg 3 times per day  Phenobarbital 32.4mg twice daily  Depakote 500mg 3 times per day.    Patient states he is getting  on Saturday, March 23. He reports being used to having seizures frequently that started in his first year of life. Seizures do not affect his mood or lifestyle since he knows they cannot be controlled.  He agrees to have VEEG, but in case of discomfort may reconsider and take electrodes off. He insists on being discharged no later than tomorrow morning.    [facility] pharmacy contacted. Patient is currently prescribed:  Keppra 500mg q12hrs  Phenobarbital 32.4mg q12hrs  Depakote 500mg q8hrs  Lipitor 20mg QHS  All medications were filled 1/8/2024 with 90 day supply, prescribed Dr. Henderson.    EMR reviewed.  Patient was admitted to EMU in January 2019 after presenting to ED with multiple health concerns, including frequent seizures. History was not clear and very inconsistent, medications could not be verified with outside pharmacies, Patient later [place].  VEEG was normal x 24 hrs, patient was recommended to continue monitoring for better characterization and treatment plan, but patient refused.  Patient was discharged home on 2 medications (phenobarbital and Keppra).    Assessment:  Considering reported history of poorly controlled seizures that are not well characterized and associated with frequent falls, patient requires VEEG monitoring to optimize medications and characterized the syndrome.   Patient is currently prescribed 3 seizure medications by PMD. Depakote and Phenobarbital levels upon arrival were not detectable.    Plan:  Transfer to EMU  Seizure precautions  Ativan 2mg IV PRN for generalized tonic-clonic seizure lasting longer than 2 minutes, or two consecutive seizures without return to baseline in-between  Continue seizure medications as verified with outside pharmacy for now   Discussed with patient at length, all questions answered.  Patient signed consent for VEEG monitoring    Plan discussed with hospitalist and nursing team. Epilepsy NP:    Patient was transferred to [facility] from [facility] for VEEG after presenting for cluster of seizures associated with fall.  Patient states he is not following up with neurologist for over 10 years, his medications are prescribed by PMD. He reports having 2-3 seizures per week, some associated with falls.  Patient reports being prescribed 3 seizure medications that he misses occasionally, but takes as prescribed majority of the time. Depakote and Phenobarbital levels upon arrival were not detectable.  He reports taking currently:  Keppra 500mg 3 times per day  Phenobarbital 32.4mg twice daily  Depakote 500mg 3 times per day.    Patient states he is getting  on Saturday, March 23. He reports being used to having seizures frequently that started in his first year of life. Seizures do not affect his mood or lifestyle since he knows they cannot be controlled.  He agrees to have VEEG, but in case of discomfort may reconsider and take electrodes off. He insists on being discharged no later than tomorrow morning.    [facility] pharmacy contacted. Patient is currently prescribed:  Keppra 500mg q12hrs  Phenobarbital 32.4mg q12hrs  Depakote 500mg q8hrs  Lipitor 20mg QHS  All medications were filled 1/8/2024 with 90 day supply, prescribed Dr. Henderson.    EMR reviewed.  Patient was admitted to EMU in January 2019 after presenting to ED with multiple health concerns, including frequent seizures. History was not clear and very inconsistent, medications could not be verified with outside pharmacies, Patient later [place].  VEEG was normal x 24 hrs, patient was recommended to continue monitoring for better characterization and treatment plan, but patient refused.  Patient was discharged home on 2 medications (phenobarbital and Keppra).    Assessment:  Considering reported history of poorly controlled seizures that are not well characterized and associated with frequent falls, patient requires VEEG monitoring to optimize medications and characterized the syndrome.   Patient is currently prescribed 3 seizure medications by PMD. Depakote and Phenobarbital levels upon arrival were not detectable.    Plan:  Transfer to EMU  Seizure precautions  Ativan 2mg IV PRN for generalized tonic-clonic seizure lasting longer than 2 minutes, or two consecutive seizures without return to baseline in-between  Continue Depakote 500mg q8hrs and Keppra 500mg q12hrs (doses verified with outside pharmacy)  D/c Phenobarbital since level is not detectable  Discussed with patient at length, all questions answered.  Patient signed consent for VEEG monitoring    Plan discussed with hospitalist and nursing team.

## 2024-03-20 NOTE — PATIENT PROFILE ADULT - NSPRONUTRITIONRISK_GEN_A_NUR
Addended by: DIANE LIMA on: 7/26/2019 10:40 AM     Modules accepted: Orders     No indicators present

## 2024-03-20 NOTE — H&P ADULT - HISTORY OF PRESENT ILLNESS
59yo M w/ pmhx of seizure disorder presents to ED for two seizures earlier today a few hours apart. Patient fell to the ground and was found by his niece. Patient states he is not always compliant with his medications. Endorses that he does not follow with a neurologist and he gets his AEDs from his PCP.     ED course:   vitals: 156/100, HR 66, T 98.8F, O2 95% RA  labs: unremarkable, u/a negative  imaging:   - CT head: no acute pathology  - CT c-spine: no acute fracture   - CT chest: no acute pathology

## 2024-03-20 NOTE — PATIENT PROFILE ADULT - STATED REASON FOR ADMISSION
seizures Cantharidin Counseling:  I discussed with the patient the risks of Cantharidin including but not limited to pain, redness, burning, itching, and blistering. Thalidomide Pregnancy And Lactation Text: This medication is Pregnancy Category X and is absolutely contraindicated during pregnancy. It is unknown if it is excreted in breast milk. Qbrexza Pregnancy And Lactation Text: There is no available data on Qbrexza use in pregnant women.  There is no available data on Qbrexza use in lactation. Enbrel Counseling:  I discussed with the patient the risks of etanercept including but not limited to myelosuppression, immunosuppression, autoimmune hepatitis, demyelinating diseases, lymphoma, and infections.  The patient understands that monitoring is required including a PPD at baseline and must alert us or the primary physician if symptoms of infection or other concerning signs are noted. Stelara Counseling:  I discussed with the patient the risks of ustekinumab including but not limited to immunosuppression, malignancy, posterior leukoencephalopathy syndrome, and serious infections.  The patient understands that monitoring is required including a PPD at baseline and must alert us or the primary physician if symptoms of infection or other concerning signs are noted. Olumiant Counseling: I discussed with the patient the risks of Olumiant therapy including but not limited to upper respiratory tract infections, shingles, cold sores, and nausea. Live vaccines should be avoided.  This medication has been linked to serious infections; higher rate of mortality; malignancy and lymphoproliferative disorders; major adverse cardiovascular events; thrombosis; gastrointestinal perforations; neutropenia; lymphopenia; anemia; liver enzyme elevations; and lipid elevations. Methotrexate Counseling:  Patient counseled regarding adverse effects of methotrexate including but not limited to nausea, vomiting, abnormalities in liver function tests. Patients may develop mouth sores, rash, diarrhea, and abnormalities in blood counts. The patient understands that monitoring is required including LFT's and blood counts.  There is a rare possibility of scarring of the liver and lung problems that can occur when taking methotrexate. Persistent nausea, loss of appetite, pale stools, dark urine, cough, and shortness of breath should be reported immediately. Patient advised to discontinue methotrexate treatment at least three months before attempting to become pregnant.  I discussed the need for folate supplements while taking methotrexate.  These supplements can decrease side effects during methotrexate treatment. The patient verbalized understanding of the proper use and possible adverse effects of methotrexate.  All of the patient's questions and concerns were addressed. Xolair Counseling:  Patient informed of potential adverse effects including but not limited to fever, muscle aches, rash and allergic reactions.  The patient verbalized understanding of the proper use and possible adverse effects of Xolair.  All of the patient's questions and concerns were addressed. Dutasteride Male Counseling: Dustasteride Counseling:  I discussed with the patient the risks of use of dutasteride including but not limited to decreased libido, decreased ejaculate volume, and gynecomastia. Women who can become pregnant should not handle medication.  All of the patient's questions and concerns were addressed. Azathioprine Counseling:  I discussed with the patient the risks of azathioprine including but not limited to myelosuppression, immunosuppression, hepatotoxicity, lymphoma, and infections.  The patient understands that monitoring is required including baseline LFTs, Creatinine, possible TPMP genotyping and weekly CBCs for the first month and then every 2 weeks thereafter.  The patient verbalized understanding of the proper use and possible adverse effects of azathioprine.  All of the patient's questions and concerns were addressed. Tazorac Counseling:  Patient advised that medication is irritating and drying.  Patient may need to apply sparingly and wash off after an hour before eventually leaving it on overnight.  The patient verbalized understanding of the proper use and possible adverse effects of tazorac.  All of the patient's questions and concerns were addressed. Vtama Pregnancy And Lactation Text: It is unknown if this medication can cause problems during pregnancy and breastfeeding. Dapsone Pregnancy And Lactation Text: This medication is Pregnancy Category C and is not considered safe during pregnancy or breast feeding. Acitretin Counseling:  I discussed with the patient the risks of acitretin including but not limited to hair loss, dry lips/skin/eyes, liver damage, hyperlipidemia, depression/suicidal ideation, photosensitivity.  Serious rare side effects can include but are not limited to pancreatitis, pseudotumor cerebri, bony changes, clot formation/stroke/heart attack.  Patient understands that alcohol is contraindicated since it can result in liver toxicity and significantly prolong the elimination of the drug by many years. Metronidazole Counseling:  I discussed with the patient the risks of metronidazole including but not limited to seizures, nausea/vomiting, a metallic taste in the mouth, nausea/vomiting and severe allergy. Rituxan Pregnancy And Lactation Text: This medication is Pregnancy Category C and it isn't know if it is safe during pregnancy. It is unknown if this medication is excreted in breast milk but similar antibodies are known to be excreted. Topical Steroids Applications Pregnancy And Lactation Text: Most topical steroids are considered safe to use during pregnancy and lactation.  Any topical steroid applied to the breast or nipple should be washed off before breastfeeding. Niacinamide Counseling: I recommended taking niacin or niacinamide, also know as vitamin B3, twice daily. Recent evidence suggests that taking vitamin B3 (500 mg twice daily) can reduce the risk of actinic keratoses and non-melanoma skin cancers. Side effects of vitamin B3 include flushing and headache. Hydroquinone Counseling:  Patient advised that medication may result in skin irritation, lightening (hypopigmentation), dryness, and burning.  In the event of skin irritation, the patient was advised to reduce the amount of the drug applied or use it less frequently.  Rarely, spots that are treated with hydroquinone can become darker (pseudoochronosis).  Should this occur, patient instructed to stop medication and call the office. The patient verbalized understanding of the proper use and possible adverse effects of hydroquinone.  All of the patient's questions and concerns were addressed. Adbry Counseling: I discussed with the patient the risks of tralokinumab including but not limited to eye infection and irritation, cold sores, injection site reactions, worsening of asthma, allergic reactions and increased risk of parasitic infection.  Live vaccines should be avoided while taking tralokinumab. The patient understands that monitoring is required and they must alert us or the primary physician if symptoms of infection or other concerning signs are noted. Detail Level: Simple Itraconazole Pregnancy And Lactation Text: This medication is Pregnancy Category C and it isn't know if it is safe during pregnancy. It is also excreted in breast milk. Bactrim Counseling:  I discussed with the patient the risks of sulfa antibiotics including but not limited to GI upset, allergic reaction, drug rash, diarrhea, dizziness, photosensitivity, and yeast infections.  Rarely, more serious reactions can occur including but not limited to aplastic anemia, agranulocytosis, methemoglobinemia, blood dyscrasias, liver or kidney failure, lung infiltrates or desquamative/blistering drug rashes. Sarecycline Pregnancy And Lactation Text: This medication is Pregnancy Category D and not consider safe during pregnancy. It is also excreted in breast milk. Otezla Pregnancy And Lactation Text: This medication is Pregnancy Category C and it isn't known if it is safe during pregnancy. It is unknown if it is excreted in breast milk. Cantharidin Pregnancy And Lactation Text: This medication has not been proven safe during pregnancy. It is unknown if this medication is excreted in breast milk. Xolair Pregnancy And Lactation Text: This medication is Pregnancy Category B and is considered safe during pregnancy. This medication is excreted in breast milk. Mirvaso Pregnancy And Lactation Text: This medication has not been assigned a Pregnancy Risk Category. It is unknown if the medication is excreted in breast milk. 5-Fu Counseling: 5-Fluorouracil Counseling:  I discussed with the patient the risks of 5-fluorouracil including but not limited to erythema, scaling, itching, weeping, crusting, and pain. Tranexamic Acid Counseling:  Patient advised of the small risk of bleeding problems with tranexamic acid. They were also instructed to call if they developed any nausea, vomiting or diarrhea. All of the patient's questions and concerns were addressed. Gabapentin Counseling: I discussed with the patient the risks of gabapentin including but not limited to dizziness, somnolence, fatigue and ataxia. Methotrexate Pregnancy And Lactation Text: This medication is Pregnancy Category X and is known to cause fetal harm. This medication is excreted in breast milk. Rhofade Counseling: Rhofade is a topical medication which can decrease superficial blood flow where applied. Side effects are uncommon and include stinging, redness and allergic reactions. Enbrel Pregnancy And Lactation Text: This medication is Pregnancy Category B and is considered safe during pregnancy. It is unknown if this medication is excreted in breast milk. Acitretin Pregnancy And Lactation Text: This medication is Pregnancy Category X and should not be given to women who are pregnant or may become pregnant in the future. This medication is excreted in breast milk. Doxepin Pregnancy And Lactation Text: This medication is Pregnancy Category C and it isn't known if it is safe during pregnancy. It is also excreted in breast milk and breast feeding isn't recommended. Olumiant Pregnancy And Lactation Text: Based on animal studies, Olumiant may cause embryo-fetal harm when administered to pregnant women.  The medication should not be used in pregnancy.  Breastfeeding is not recommended during treatment. Zoryve Counseling:  I discussed with the patient that Zoryve is not for use in the eyes, mouth or vagina. The most commonly reported side effects include diarrhea, headache, insomnia, application site pain, upper respiratory tract infections, and urinary tract infections.  All of the patient's questions and concerns were addressed. Metronidazole Pregnancy And Lactation Text: This medication is Pregnancy Category B and considered safe during pregnancy.  It is also excreted in breast milk. Azathioprine Pregnancy And Lactation Text: This medication is Pregnancy Category D and isn't considered safe during pregnancy. It is unknown if this medication is excreted in breast milk. Dutasteride Pregnancy And Lactation Text: This medication is absolutely contraindicated in women, especially during pregnancy and breast feeding. Feminization of male fetuses is possible if taking while pregnant. Hydroquinone Pregnancy And Lactation Text: This medication has not been assigned a Pregnancy Risk Category but animal studies failed to show danger with the topical medication. It is unknown if the medication is excreted in breast milk. Adbry Pregnancy And Lactation Text: It is unknown if this medication will adversely affect pregnancy or breast feeding. Tazorac Pregnancy And Lactation Text: This medication is not safe during pregnancy. It is unknown if this medication is excreted in breast milk. Bactrim Pregnancy And Lactation Text: This medication is Pregnancy Category D and is known to cause fetal risk.  It is also excreted in breast milk. Aklief counseling:  Patient advised to apply a pea-sized amount only at bedtime and wait 30 minutes after washing their face before applying.  If too drying, patient may add a non-comedogenic moisturizer.  The most commonly reported side effects including irritation, redness, scaling, dryness, stinging, burning, itching, and increased risk of sunburn.  The patient verbalized understanding of the proper use and possible adverse effects of retinoids.  All of the patient's questions and concerns were addressed. Opzelura Counseling:  I discussed with the patient the risks of Opzelura including but not limited to nasopharngitis, bronchitis, ear infection, eosinophila, hives, diarrhea, folliculitis, tonsillitis, and rhinorrhea.  Taken orally, this medication has been linked to serious infections; higher rate of mortality; malignancy and lymphoproliferative disorders; major adverse cardiovascular events; thrombosis; thrombocytopenia, anemia, and neutropenia; and lipid elevations. Siliq Counseling:  I discussed with the patient the risks of Siliq including but not limited to new or worsening depression, suicidal thoughts and behavior, immunosuppression, malignancy, posterior leukoencephalopathy syndrome, and serious infections.  The patient understands that monitoring is required including a PPD at baseline and must alert us or the primary physician if symptoms of infection or other concerning signs are noted. There is also a special program designed to monitor depression which is required with Siliq. Topical Sulfur Applications Counseling: Topical Sulfur Counseling: Patient counseled that this medication may cause skin irritation or allergic reactions.  In the event of skin irritation, the patient was advised to reduce the amount of the drug applied or use it less frequently.   The patient verbalized understanding of the proper use and possible adverse effects of topical sulfur application.  All of the patient's questions and concerns were addressed. Niacinamide Pregnancy And Lactation Text: These medications are considered safe during pregnancy. Prednisone Counseling:  I discussed with the patient the risks of prolonged use of prednisone including but not limited to weight gain, insomnia, osteoporosis, mood changes, diabetes, susceptibility to infection, glaucoma and high blood pressure.  In cases where prednisone use is prolonged, patients should be monitored with blood pressure checks, serum glucose levels and an eye exam.  Additionally, the patient may need to be placed on GI prophylaxis, PCP prophylaxis, and calcium and vitamin D supplementation and/or a bisphosphonate.  The patient verbalized understanding of the proper use and the possible adverse effects of prednisone.  All of the patient's questions and concerns were addressed. Oxybutynin Counseling:  I discussed with the patient the risks of oxybutynin including but not limited to skin rash, drowsiness, dry mouth, difficulty urinating, and blurred vision. Tetracycline Counseling: Patient counseled regarding possible photosensitivity and increased risk for sunburn.  Patient instructed to avoid sunlight, if possible.  When exposed to sunlight, patients should wear protective clothing, sunglasses, and sunscreen.  The patient was instructed to call the office immediately if the following severe adverse effects occur:  hearing changes, easy bruising/bleeding, severe headache, or vision changes.  The patient verbalized understanding of the proper use and possible adverse effects of tetracycline.  All of the patient's questions and concerns were addressed. Patient understands to avoid pregnancy while on therapy due to potential birth defects. Ketoconazole Counseling:   Patient counseled regarding improving absorption with orange juice.  Adverse effects include but are not limited to breast enlargement, headache, diarrhea, nausea, upset stomach, liver function test abnormalities, taste disturbance, and stomach pain.  There is a rare possibility of liver failure that can occur when taking ketoconazole. The patient understands that monitoring of LFTs may be required, especially at baseline. The patient verbalized understanding of the proper use and possible adverse effects of ketoconazole.  All of the patient's questions and concerns were addressed. Hydroxyzine Counseling: Patient advised that the medication is sedating and not to drive a car after taking this medication.  Patient informed of potential adverse effects including but not limited to dry mouth, urinary retention, and blurry vision.  The patient verbalized understanding of the proper use and possible adverse effects of hydroxyzine.  All of the patient's questions and concerns were addressed. Gabapentin Pregnancy And Lactation Text: This medication is Pregnancy Category C and isn't considered safe during pregnancy. It is excreted in breast milk. Minocycline Counseling: Patient advised regarding possible photosensitivity and discoloration of the teeth, skin, lips, tongue and gums.  Patient instructed to avoid sunlight, if possible.  When exposed to sunlight, patients should wear protective clothing, sunglasses, and sunscreen.  The patient was instructed to call the office immediately if the following severe adverse effects occur:  hearing changes, easy bruising/bleeding, severe headache, or vision changes.  The patient verbalized understanding of the proper use and possible adverse effects of minocycline.  All of the patient's questions and concerns were addressed. Rinvoq Counseling: I discussed with the patient the risks of Rinvoq therapy including but not limited to upper respiratory tract infections, shingles, cold sores, bronchitis, nausea, cough, fever, acne, and headache. Live vaccines should be avoided.  This medication has been linked to serious infections; higher rate of mortality; malignancy and lymphoproliferative disorders; major adverse cardiovascular events; thrombosis; thrombocytopenia, anemia, and neutropenia; lipid elevations; liver enzyme elevations; and gastrointestinal perforations. Bexarotene Counseling:  I discussed with the patient the risks of bexarotene including but not limited to hair loss, dry lips/skin/eyes, liver abnormalities, hyperlipidemia, pancreatitis, depression/suicidal ideation, photosensitivity, drug rash/allergic reactions, hypothyroidism, anemia, leukopenia, infection, cataracts, and teratogenicity.  Patient understands that they will need regular blood tests to check lipid profile, liver function tests, white blood cell count, thyroid function tests and pregnancy test if applicable. 5-Fu Pregnancy And Lactation Text: This medication is Pregnancy Category X and contraindicated in pregnancy and in women who may become pregnant. It is unknown if this medication is excreted in breast milk. Tranexamic Acid Pregnancy And Lactation Text: It is unknown if this medication is safe during pregnancy or breast feeding. Humira Counseling:  I discussed with the patient the risks of adalimumab including but not limited to myelosuppression, immunosuppression, autoimmune hepatitis, demyelinating diseases, lymphoma, and serious infections.  The patient understands that monitoring is required including a PPD at baseline and must alert us or the primary physician if symptoms of infection or other concerning signs are noted. Imiquimod Counseling:  I discussed with the patient the risks of imiquimod including but not limited to erythema, scaling, itching, weeping, crusting, and pain.  Patient understands that the inflammatory response to imiquimod is variable from person to person and was educated regarded proper titration schedule.  If flu-like symptoms develop, patient knows to discontinue the medication and contact us. Finasteride Male Counseling: Finasteride Counseling:  I discussed with the patient the risks of use of finasteride including but not limited to decreased libido, decreased ejaculate volume, gynecomastia, and depression. Women should not handle medication.  All of the patient's questions and concerns were addressed. Include Pregnancy/Lactation Warning?: No Siliq Pregnancy And Lactation Text: The risk during pregnancy and breastfeeding is uncertain with this medication. Cimzia Counseling:  I discussed with the patient the risks of Cimzia including but not limited to immunosuppression, allergic reactions and infections.  The patient understands that monitoring is required including a PPD at baseline and must alert us or the primary physician if symptoms of infection or other concerning signs are noted. Nsaids Counseling: NSAID Counseling: I discussed with the patient that NSAIDs should be taken with food. Prolonged use of NSAIDs can result in the development of stomach ulcers.  Patient advised to stop taking NSAIDs if abdominal pain occurs.  The patient verbalized understanding of the proper use and possible adverse effects of NSAIDs.  All of the patient's questions and concerns were addressed. Topical Clindamycin Counseling: Patient counseled that this medication may cause skin irritation or allergic reactions.  In the event of skin irritation, the patient was advised to reduce the amount of the drug applied or use it less frequently.   The patient verbalized understanding of the proper use and possible adverse effects of clindamycin.  All of the patient's questions and concerns were addressed. Erivedge Counseling- I discussed with the patient the risks of Erivedge including but not limited to nausea, vomiting, diarrhea, constipation, weight loss, changes in the sense of taste, decreased appetite, muscle spasms, and hair loss.  The patient verbalized understanding of the proper use and possible adverse effects of Erivedge.  All of the patient's questions and concerns were addressed. Aklief Pregnancy And Lactation Text: It is unknown if this medication is safe to use during pregnancy.  It is unknown if this medication is excreted in breast milk.  Breastfeeding women should use the topical cream on the smallest area of the skin for the shortest time needed while breastfeeding.  Do not apply to nipple and areola. Opzelura Pregnancy And Lactation Text: There is insufficient data to evaluate drug-associated risk for major birth defects, miscarriage, or other adverse maternal or fetal outcomes.  There is a pregnancy registry that monitors pregnancy outcomes in pregnant persons exposed to the medication during pregnancy.  It is unknown if this medication is excreted in breast milk.  Do not breastfeed during treatment and for about 4 weeks after the last dose. Oxybutynin Pregnancy And Lactation Text: This medication is Pregnancy Category B and is considered safe during pregnancy. It is unknown if it is excreted in breast milk. Topical Sulfur Applications Pregnancy And Lactation Text: This medication is Pregnancy Category C and has an unknown safety profile during pregnancy. It is unknown if this topical medication is excreted in breast milk. Cellcept Counseling:  I discussed with the patient the risks of mycophenolate mofetil including but not limited to infection/immunosuppression, GI upset, hypokalemia, hypercholesterolemia, bone marrow suppression, lymphoproliferative disorders, malignancy, GI ulceration/bleed/perforation, colitis, interstitial lung disease, kidney failure, progressive multifocal leukoencephalopathy, and birth defects.  The patient understands that monitoring is required including a baseline creatinine and regular CBC testing. In addition, patient must alert us immediately if symptoms of infection or other concerning signs are noted. Bexarotene Pregnancy And Lactation Text: This medication is Pregnancy Category X and should not be given to women who are pregnant or may become pregnant. This medication should not be used if you are breast feeding. Rinvoq Pregnancy And Lactation Text: Based on animal studies, Rinvoq may cause embryo-fetal harm when administered to pregnant women.  The medication should not be used in pregnancy.  Breastfeeding is not recommended during treatment and for 6 days after the last dose. Prednisone Pregnancy And Lactation Text: This medication is Pregnancy Category C and it isn't know if it is safe during pregnancy. This medication is excreted in breast milk. Solaraze Counseling:  I discussed with the patient the risks of Solaraze including but not limited to erythema, scaling, itching, weeping, crusting, and pain. Zyclara Counseling:  I discussed with the patient the risks of imiquimod including but not limited to erythema, scaling, itching, weeping, crusting, and pain.  Patient understands that the inflammatory response to imiquimod is variable from person to person and was educated regarded proper titration schedule.  If flu-like symptoms develop, patient knows to discontinue the medication and contact us. Hydroxyzine Pregnancy And Lactation Text: This medication is not safe during pregnancy and should not be taken. It is also excreted in breast milk and breast feeding isn't recommended. Cephalexin Counseling: I counseled the patient regarding use of cephalexin as an antibiotic for prophylactic and/or therapeutic purposes. Cephalexin (commonly prescribed under brand name Keflex) is a cephalosporin antibiotic which is active against numerous classes of bacteria, including most skin bacteria. Side effects may include nausea, diarrhea, gastrointestinal upset, rash, hives, yeast infections, and in rare cases, hepatitis, kidney disease, seizures, fever, confusion, neurologic symptoms, and others. Patients with severe allergies to penicillin medications are cautioned that there is about a 10% incidence of cross-reactivity with cephalosporins. When possible, patients with penicillin allergies should use alternatives to cephalosporins for antibiotic therapy. Ketoconazole Pregnancy And Lactation Text: This medication is Pregnancy Category C and it isn't know if it is safe during pregnancy. It is also excreted in breast milk and breast feeding isn't recommended. Drysol Counseling:  I discussed with the patient the risks of drysol/aluminum chloride including but not limited to skin rash, itching, irritation, burning. Valtrex Counseling: I discussed with the patient the risks of valacyclovir including but not limited to kidney damage, nausea, vomiting and severe allergy.  The patient understands that if the infection seems to be worsening or is not improving, they are to call. Glycopyrrolate Counseling:  I discussed with the patient the risks of glycopyrrolate including but not limited to skin rash, drowsiness, dry mouth, difficulty urinating, and blurred vision. Cimzia Pregnancy And Lactation Text: This medication crosses the placenta but can be considered safe in certain situations. Cimzia may be excreted in breast milk. Picato Counseling:  I discussed with the patient the risks of Picato including but not limited to erythema, scaling, itching, weeping, crusting, and pain. Finasteride Pregnancy And Lactation Text: This medication is absolutely contraindicated during pregnancy. It is unknown if it is excreted in breast milk. Imiquimod Pregnancy And Lactation Text: This medication is Pregnancy Category C. It is unknown if this medication is excreted in breast milk. Nsaids Pregnancy And Lactation Text: These medications are considered safe up to 30 weeks gestation. It is excreted in breast milk. Clindamycin Pregnancy And Lactation Text: This medication can be used in pregnancy if certain situations. Clindamycin is also present in breast milk. Propranolol Counseling:  I discussed with the patient the risks of propranolol including but not limited to low heart rate, low blood pressure, low blood sugar, restlessness and increased cold sensitivity. They should call the office if they experience any of these side effects. Azelaic Acid Counseling: Patient counseled that medicine may cause skin irritation and to avoid applying near the eyes.  In the event of skin irritation, the patient was advised to reduce the amount of the drug applied or use it less frequently.   The patient verbalized understanding of the proper use and possible adverse effects of azelaic acid.  All of the patient's questions and concerns were addressed. Clofazimine Counseling:  I discussed with the patient the risks of clofazimine including but not limited to skin and eye pigmentation, liver damage, nausea/vomiting, gastrointestinal bleeding and allergy. Wartpeel Counseling:  I discussed with the patient the risks of Wartpeel including but not limited to erythema, scaling, itching, weeping, crusting, and pain. Sotyktu Counseling:  I discussed the most common side effects of Sotyktu including: common cold, sore throat, sinus infections, cold sores, canker sores, folliculitis, and acne.? I also discussed more serious side effects of Sotyktu including but not limited to: serious allergic reactions; increased risk for infections such as TB; cancers such as lymphomas; rhabdomyolysis and elevated CPK; and elevated triglycerides and liver enzymes.? Solaraze Pregnancy And Lactation Text: This medication is Pregnancy Category B and is considered safe. There is some data to suggest avoiding during the third trimester. It is unknown if this medication is excreted in breast milk. Ilumya Counseling: I discussed with the patient the risks of tildrakizumab including but not limited to immunosuppression, malignancy, posterior leukoencephalopathy syndrome, and serious infections.  The patient understands that monitoring is required including a PPD at baseline and must alert us or the primary physician if symptoms of infection or other concerning signs are noted. Isotretinoin Counseling: Patient should get monthly blood tests, not donate blood, not drive at night if vision affected, not share medication, and not undergo elective surgery for 6 months after tx completed. Side effects reviewed, pt to contact office should one occur. Valtrex Pregnancy And Lactation Text: this medication is Pregnancy Category B and is considered safe during pregnancy. This medication is not directly found in breast milk but it's metabolite acyclovir is present. Terbinafine Counseling: Patient counseling regarding adverse effects of terbinafine including but not limited to headache, diarrhea, rash, upset stomach, liver function test abnormalities, itching, taste/smell disturbance, nausea, abdominal pain, and flatulence.  There is a rare possibility of liver failure that can occur when taking terbinafine.  The patient understands that a baseline LFT and kidney function test may be required. The patient verbalized understanding of the proper use and possible adverse effects of terbinafine.  All of the patient's questions and concerns were addressed. Cephalexin Pregnancy And Lactation Text: This medication is Pregnancy Category B and considered safe during pregnancy.  It is also excreted in breast milk but can be used safely for shorter doses. Drysol Pregnancy And Lactation Text: This medication is considered safe during pregnancy and breast feeding. Topical Ketoconazole Counseling: Patient counseled that this medication may cause skin irritation or allergic reactions.  In the event of skin irritation, the patient was advised to reduce the amount of the drug applied or use it less frequently.   The patient verbalized understanding of the proper use and possible adverse effects of ketoconazole.  All of the patient's questions and concerns were addressed. Quinolones Counseling:  I discussed with the patient the risks of fluoroquinolones including but not limited to GI upset, allergic reaction, drug rash, diarrhea, dizziness, photosensitivity, yeast infections, liver function test abnormalities, tendonitis/tendon rupture. Taltz Counseling: I discussed with the patient the risks of ixekizumab including but not limited to immunosuppression, serious infections, worsening of inflammatory bowel disease and drug reactions.  The patient understands that monitoring is required including a PPD at baseline and must alert us or the primary physician if symptoms of infection or other concerning signs are noted. Glycopyrrolate Pregnancy And Lactation Text: This medication is Pregnancy Category B and is considered safe during pregnancy. It is unknown if it is excreted breast milk. Simponi Counseling:  I discussed with the patient the risks of golimumab including but not limited to myelosuppression, immunosuppression, autoimmune hepatitis, demyelinating diseases, lymphoma, and serious infections.  The patient understands that monitoring is required including a PPD at baseline and must alert us or the primary physician if symptoms of infection or other concerning signs are noted. Cibinqo Counseling: I discussed with the patient the risks of Cibinqo therapy including but not limited to common cold, nausea, headache, cold sores, increased blood CPK levels, dizziness, UTIs, fatigue, acne, and vomitting. Live vaccines should be avoided.  This medication has been linked to serious infections; higher rate of mortality; malignancy and lymphoproliferative disorders; major adverse cardiovascular events; thrombosis; thrombocytopenia and lymphopenia; lipid elevations; and retinal detachment. Fluconazole Counseling:  Patient counseled regarding adverse effects of fluconazole including but not limited to headache, diarrhea, nausea, upset stomach, liver function test abnormalities, taste disturbance, and stomach pain.  There is a rare possibility of liver failure that can occur when taking fluconazole.  The patient understands that monitoring of LFTs and kidney function test may be required, especially at baseline. The patient verbalized understanding of the proper use and possible adverse effects of fluconazole.  All of the patient's questions and concerns were addressed. Albendazole Counseling:  I discussed with the patient the risks of albendazole including but not limited to cytopenia, kidney damage, nausea/vomiting and severe allergy.  The patient understands that this medication is being used in an off-label manner. Libtayo Counseling- I discussed with the patient the risks of Libtayo including but not limited to nausea, vomiting, diarrhea, and bone or muscle pain.  The patient verbalized understanding of the proper use and possible adverse effects of Libtayo.  All of the patient's questions and concerns were addressed. Cyclophosphamide Counseling:  I discussed with the patient the risks of cyclophosphamide including but not limited to hair loss, hormonal abnormalities, decreased fertility, abdominal pain, diarrhea, nausea and vomiting, bone marrow suppression and infection. The patient understands that monitoring is required while taking this medication. Birth Control Pills Counseling: Birth Control Pill Counseling: I discussed with the patient the potential side effects of OCPs including but not limited to increased risk of stroke, heart attack, thrombophlebitis, deep venous thrombosis, hepatic adenomas, breast changes, GI upset, headaches, and depression.  The patient verbalized understanding of the proper use and possible adverse effects of OCPs. All of the patient's questions and concerns were addressed. Klisyri Counseling:  I discussed with the patient the risks of Klisyri including but not limited to erythema, scaling, itching, weeping, crusting, and pain. Cosentyx Counseling:  I discussed with the patient the risks of Cosentyx including but not limited to worsening of Crohn's disease, immunosuppression, allergic reactions and infections.  The patient understands that monitoring is required including a PPD at baseline and must alert us or the primary physician if symptoms of infection or other concerning signs are noted. Olanzapine Counseling- I discussed with the patient the common side effects of olanzapine including but are not limited to: lack of energy, dry mouth, increased appetite, sleepiness, tremor, constipation, dizziness, changes in behavior, or restlessness.  Explained that teenagers are more likely to experience headaches, abdominal pain, pain in the arms or legs, tiredness, and sleepiness.  Serious side effects include but are not limited: increased risk of death in elderly patients who are confused, have memory loss, or dementia-related psychosis; hyperglycemia; increased cholesterol and triglycerides; and weight gain. Doxycycline Counseling:  Patient counseled regarding possible photosensitivity and increased risk for sunburn.  Patient instructed to avoid sunlight, if possible.  When exposed to sunlight, patients should wear protective clothing, sunglasses, and sunscreen.  The patient was instructed to call the office immediately if the following severe adverse effects occur:  hearing changes, easy bruising/bleeding, severe headache, or vision changes.  The patient verbalized understanding of the proper use and possible adverse effects of doxycycline.  All of the patient's questions and concerns were addressed. Terbinafine Pregnancy And Lactation Text: This medication is Pregnancy Category B and is considered safe during pregnancy. It is also excreted in breast milk and breast feeding isn't recommended. Clindamycin Counseling: I counseled the patient regarding use of clindamycin as an antibiotic for prophylactic and/or therapeutic purposes. Clindamycin is active against numerous classes of bacteria, including skin bacteria. Side effects may include nausea, diarrhea, gastrointestinal upset, rash, hives, yeast infections, and in rare cases, colitis. Propranolol Pregnancy And Lactation Text: This medication is Pregnancy Category C and it isn't known if it is safe during pregnancy. It is excreted in breast milk. Elidel Counseling: Patient may experience a mild burning sensation during topical application. Elidel is not approved in children less than 2 years of age. There have been case reports of hematologic and skin malignancies in patients using topical calcineurin inhibitors although causality is questionable. Hydroxychloroquine Counseling:  I discussed with the patient that a baseline ophthalmologic exam is needed at the start of therapy and every year thereafter while on therapy. A CBC may also be warranted for monitoring.  The side effects of this medication were discussed with the patient, including but not limited to agranulocytosis, aplastic anemia, seizures, rashes, retinopathy, and liver toxicity. Patient instructed to call the office should any adverse effect occur.  The patient verbalized understanding of the proper use and possible adverse effects of Plaquenil.  All the patient's questions and concerns were addressed. Soolantra Counseling: I discussed with the patients the risks of topial Soolantra. This is a medicine which decreases the number of mites and inflammation in the skin. You experience burning, stinging, eye irritation or allergic reactions.  Please call our office if you develop any problems from using this medication. Sotyktu Pregnancy And Lactation Text: There is insufficient data to evaluate whether or not Sotyktu is safe to use during pregnancy.? ?It is not known if Sotyktu passes into breast milk and whether or not it is safe to use when breastfeeding.?? Cyclophosphamide Pregnancy And Lactation Text: This medication is Pregnancy Category D and it isn't considered safe during pregnancy. This medication is excreted in breast milk. Libtayo Pregnancy And Lactation Text: This medication is contraindicated in pregnancy and when breast feeding. Birth Control Pills Pregnancy And Lactation Text: This medication should be avoided if pregnant and for the first 30 days post-partum. Olanzapine Pregnancy And Lactation Text: This medication is pregnancy category C.   There are no adequate and well controlled trials with olanzapine in pregnant females.  Olanzapine should be used during pregnancy only if the potential benefit justifies the potential risk to the fetus.   In a study in lactating healthy women, olanzapine was excreted in breast milk.  It is recommended that women taking olanzapine should not breast feed. Klisyri Pregnancy And Lactation Text: It is unknown if this medication can harm a developing fetus or if it is excreted in breast milk. Isotretinoin Pregnancy And Lactation Text: This medication is Pregnancy Category X and is considered extremely dangerous during pregnancy. It is unknown if it is excreted in breast milk. Opioid Counseling: I discussed with the patient the potential side effects of opioids including but not limited to addiction, altered mental status, and depression. I stressed avoiding alcohol, benzodiazepines, muscle relaxants and sleep aids unless specifically okayed by a physician. The patient verbalized understanding of the proper use and possible adverse effects of opioids. All of the patient's questions and concerns were addressed. They were instructed to flush the remaining pills down the toilet if they did not need them for pain. Winlevi Counseling:  I discussed with the patient the risks of topical clascoterone including but not limited to erythema, scaling, itching, and stinging. Patient voiced their understanding. Colchicine Counseling:  Patient counseled regarding adverse effects including but not limited to stomach upset (nausea, vomiting, stomach pain, or diarrhea).  Patient instructed to limit alcohol consumption while taking this medication.  Colchicine may reduce blood counts especially with prolonged use.  The patient understands that monitoring of kidney function and blood counts may be required, especially at baseline. The patient verbalized understanding of the proper use and possible adverse effects of colchicine.  All of the patient's questions and concerns were addressed. Cibinqo Pregnancy And Lactation Text: It is unknown if this medication will adversely affect pregnancy or breast feeding.  You should not take this medication if you are currently pregnant or planning a pregnancy or while breastfeeding. Benzoyl Peroxide Counseling: Patient counseled that medicine may cause skin irritation and bleach clothing.  In the event of skin irritation, the patient was advised to reduce the amount of the drug applied or use it less frequently.   The patient verbalized understanding of the proper use and possible adverse effects of benzoyl peroxide.  All of the patient's questions and concerns were addressed. Doxycycline Pregnancy And Lactation Text: This medication is Pregnancy Category D and not consider safe during pregnancy. It is also excreted in breast milk but is considered safe for shorter treatment courses. Protopic Counseling: Patient may experience a mild burning sensation during topical application. Protopic is not approved in children less than 2 years of age. There have been case reports of hematologic and skin malignancies in patients using topical calcineurin inhibitors although causality is questionable. Albendazole Pregnancy And Lactation Text: This medication is Pregnancy Category C and it isn't known if it is safe during pregnancy. It is also excreted in breast milk. SSKI Counseling:  I discussed with the patient the risks of SSKI including but not limited to thyroid abnormalities, metallic taste, GI upset, fever, headache, acne, arthralgias, paraesthesias, lymphadenopathy, easy bleeding, arrhythmias, and allergic reaction. Hydroxychloroquine Pregnancy And Lactation Text: This medication has been shown to cause fetal harm but it isn't assigned a Pregnancy Risk Category. There are small amounts excreted in breast milk. High Dose Vitamin A Counseling: Side effects reviewed, pt to contact office should one occur. Infliximab Counseling:  I discussed with the patient the risks of infliximab including but not limited to myelosuppression, immunosuppression, autoimmune hepatitis, demyelinating diseases, lymphoma, and serious infections.  The patient understands that monitoring is required including a PPD at baseline and must alert us or the primary physician if symptoms of infection or other concerning signs are noted. Xeljanz Counseling: I discussed with the patient the risks of Xeljanz therapy including increased risk of infection, liver issues, headache, diarrhea, or cold symptoms. Live vaccines should be avoided. They were instructed to call if they have any problems. Soolantra Pregnancy And Lactation Text: This medication is Pregnancy Category C. This medication is considered safe during breast feeding. Dupixent Counseling: I discussed with the patient the risks of dupilumab including but not limited to eye infection and irritation, cold sores, injection site reactions, worsening of asthma, allergic reactions and increased risk of parasitic infection.  Live vaccines should be avoided while taking dupilumab. Dupilumab will also interact with certain medications such as warfarin and cyclosporine. The patient understands that monitoring is required and they must alert us or the primary physician if symptoms of infection or other concerning signs are noted. Spironolactone Counseling: Patient advised regarding risks of diarrhea, abdominal pain, hyperkalemia, birth defects (for female patients), liver toxicity and renal toxicity. The patient may need blood work to monitor liver and kidney function and potassium levels while on therapy. The patient verbalized understanding of the proper use and possible adverse effects of spironolactone.  All of the patient's questions and concerns were addressed. Cyclosporine Counseling:  I discussed with the patient the risks of cyclosporine including but not limited to hypertension, gingival hyperplasia,myelosuppression, immunosuppression, liver damage, kidney damage, neurotoxicity, lymphoma, and serious infections. The patient understands that monitoring is required including baseline blood pressure, CBC, CMP, lipid panel and uric acid, and then 1-2 times monthly CMP and blood pressure. Minoxidil Counseling: Minoxidil is a topical medication which can increase blood flow where it is applied. It is uncertain how this medication increases hair growth. Side effects are uncommon and include stinging and allergic reactions. Cimetidine Counseling:  I discussed with the patient the risks of Cimetidine including but not limited to gynecomastia, headache, diarrhea, nausea, drowsiness, arrhythmias, pancreatitis, skin rashes, psychosis, bone marrow suppression and kidney toxicity. Ivermectin Counseling:  Patient instructed to take medication on an empty stomach with a full glass of water.  Patient informed of potential adverse effects including but not limited to nausea, diarrhea, dizziness, itching, and swelling of the extremities or lymph nodes.  The patient verbalized understanding of the proper use and possible adverse effects of ivermectin.  All of the patient's questions and concerns were addressed. Litfulo Counseling: I discussed with the patient the risks of Litfulo therapy including but not limited to upper respiratory tract infections, shingles, cold sores, and nausea. Live vaccines should be avoided.  This medication has been linked to serious infections; higher rate of mortality; malignancy and lymphoproliferative disorders; major adverse cardiovascular events; thrombosis; gastrointestinal perforations; neutropenia; lymphopenia; anemia; liver enzyme elevations; and lipid elevations. Winlevi Pregnancy And Lactation Text: This medication is considered safe during pregnancy and breastfeeding. Oral Minoxidil Counseling- I discussed with the patient the risks of oral minoxidil including but not limited to shortness of breath, swelling of the feet or ankles, dizziness, lightheadedness, unwanted hair growth and allergic reaction.  The patient verbalized understanding of the proper use and possible adverse effects of oral minoxidil.  All of the patient's questions and concerns were addressed. Tremfya Counseling: I discussed with the patient the risks of guselkumab including but not limited to immunosuppression, serious infections, worsening of inflammatory bowel disease and drug reactions.  The patient understands that monitoring is required including a PPD at baseline and must alert us or the primary physician if symptoms of infection or other concerning signs are noted. Odomzo Counseling- I discussed with the patient the risks of Odomzo including but not limited to nausea, vomiting, diarrhea, constipation, weight loss, changes in the sense of taste, decreased appetite, muscle spasms, and hair loss.  The patient verbalized understanding of the proper use and possible adverse effects of Odomzo.  All of the patient's questions and concerns were addressed. Topical Metronidazole Counseling: Metronidazole is a topical antibiotic medication. You may experience burning, stinging, redness, or allergic reactions.  Please call our office if you develop any problems from using this medication. Griseofulvin Counseling:  I discussed with the patient the risks of griseofulvin including but not limited to photosensitivity, cytopenia, liver damage, nausea/vomiting and severe allergy.  The patient understands that this medication is best absorbed when taken with a fatty meal (e.g., ice cream or french fries). Rifampin Counseling: I discussed with the patient the risks of rifampin including but not limited to liver damage, kidney damage, red-orange body fluids, nausea/vomiting and severe allergy. Benzoyl Peroxide Pregnancy And Lactation Text: This medication is Pregnancy Category C. It is unknown if benzoyl peroxide is excreted in breast milk. Sski Pregnancy And Lactation Text: This medication is Pregnancy Category D and isn't considered safe during pregnancy. It is excreted in breast milk. Opioid Pregnancy And Lactation Text: These medications can lead to premature delivery and should be avoided during pregnancy. These medications are also present in breast milk in small amounts. Skyrizi Counseling: I discussed with the patient the risks of risankizumab-rzaa including but not limited to immunosuppression, and serious infections.  The patient understands that monitoring is required including a PPD at baseline and must alert us or the primary physician if symptoms of infection or other concerning signs are noted. Protopic Pregnancy And Lactation Text: This medication is Pregnancy Category C. It is unknown if this medication is excreted in breast milk when applied topically. Low Dose Naltrexone Counseling- I discussed with the patient the potential risks and side effects of low dose naltrexone including but not limited to: more vivid dreams, headaches, nausea, vomiting, abdominal pain, fatigue, dizziness, and anxiety. Eucrisa Counseling: Patient may experience a mild burning sensation during topical application. Eucrisa is not approved in children less than 2 years of age. Erythromycin Counseling:  I discussed with the patient the risks of erythromycin including but not limited to GI upset, allergic reaction, drug rash, diarrhea, increase in liver enzymes, and yeast infections. Topical Retinoid counseling:  Patient advised to apply a pea-sized amount only at bedtime and wait 30 minutes after washing their face before applying.  If too drying, patient may add a non-comedogenic moisturizer. The patient verbalized understanding of the proper use and possible adverse effects of retinoids.  All of the patient's questions and concerns were addressed. Oral Minoxidil Pregnancy And Lactation Text: This medication should only be used when clearly needed if you are pregnant, attempting to become pregnant or breast feeding. Rifampin Pregnancy And Lactation Text: This medication is Pregnancy Category C and it isn't know if it is safe during pregnancy. It is also excreted in breast milk and should not be used if you are breast feeding. Griseofulvin Pregnancy And Lactation Text: This medication is Pregnancy Category X and is known to cause serious birth defects. It is unknown if this medication is excreted in breast milk but breast feeding should be avoided. Azithromycin Counseling:  I discussed with the patient the risks of azithromycin including but not limited to GI upset, allergic reaction, drug rash, diarrhea, and yeast infections. Xeldanielz Pregnancy And Lactation Text: This medication is Pregnancy Category D and is not considered safe during pregnancy.  The risk during breast feeding is also uncertain. High Dose Vitamin A Pregnancy And Lactation Text: High dose vitamin A therapy is contraindicated during pregnancy and breast feeding. Topical Metronidazole Pregnancy And Lactation Text: This medication is Pregnancy Category B and considered safe during pregnancy.  It is also considered safe to use while breastfeeding. Calcipotriene Counseling:  I discussed with the patient the risks of calcipotriene including but not limited to erythema, scaling, itching, and irritation. Spironolactone Pregnancy And Lactation Text: This medication can cause feminization of the male fetus and should be avoided during pregnancy. The active metabolite is also found in breast milk. Qbrexza Counseling:  I discussed with the patient the risks of Qbrexza including but not limited to headache, mydriasis, blurred vision, dry eyes, nasal dryness, dry mouth, dry throat, dry skin, urinary hesitation, and constipation.  Local skin reactions including erythema, burning, stinging, and itching can also occur. Dupixent Pregnancy And Lactation Text: This medication likely crosses the placenta but the risk for the fetus is uncertain. This medication is excreted in breast milk. Thalidomide Counseling: I discussed with the patient the risks of thalidomide including but not limited to birth defects, anxiety, weakness, chest pain, dizziness, cough and severe allergy. Dapsone Counseling: I discussed with the patient the risks of dapsone including but not limited to hemolytic anemia, agranulocytosis, rashes, methemoglobinemia, kidney failure, peripheral neuropathy, headaches, GI upset, and liver toxicity.  Patients who start dapsone require monitoring including baseline LFTs and weekly CBCs for the first month, then every month thereafter.  The patient verbalized understanding of the proper use and possible adverse effects of dapsone.  All of the patient's questions and concerns were addressed. VTAMA Counseling: I discussed with the patient that VTAMA is not for use in the eyes, mouth or mouth. They should call the office if they develop any signs of allergic reactions to VTAMA. The patient verbalized understanding of the proper use and possible adverse effects of VTAMA.  All of the patient's questions and concerns were addressed. Erythromycin Pregnancy And Lactation Text: This medication is Pregnancy Category B and is considered safe during pregnancy. It is also excreted in breast milk. Litfulo Pregnancy And Lactation Text: Based on animal studies, Lifulo may cause embryo-fetal harm when administered to pregnant women.  The medication should not be used in pregnancy.  Breastfeeding is not recommended during treatment. Carac Counseling:  I discussed with the patient the risks of Carac including but not limited to erythema, scaling, itching, weeping, crusting, and pain. Calcipotriene Pregnancy And Lactation Text: The use of this medication during pregnancy or lactation is not recommended as there is insufficient data. Arava Counseling:  Patient counseled regarding adverse effects of Arava including but not limited to nausea, vomiting, abnormalities in liver function tests. Patients may develop mouth sores, rash, diarrhea, and abnormalities in blood counts. The patient understands that monitoring is required including LFTs and blood counts.  There is a rare possibility of scarring of the liver and lung problems that can occur when taking methotrexate. Persistent nausea, loss of appetite, pale stools, dark urine, cough, and shortness of breath should be reported immediately. Patient advised to discontinue Arava treatment and consult with a physician prior to attempting conception. The patient will have to undergo a treatment to eliminate Arava from the body prior to conception. Rituxan Counseling:  I discussed with the patient the risks of Rituxan infusions. Side effects can include infusion reactions, severe drug rashes including mucocutaneous reactions, reactivation of latent hepatitis and other infections and rarely progressive multifocal leukoencephalopathy.  All of the patient's questions and concerns were addressed. Low Dose Naltrexone Pregnancy And Lactation Text: Naltrexone is pregnancy category C.  There have been no adequate and well-controlled studies in pregnant women.  It should be used in pregnancy only if the potential benefit justifies the potential risk to the fetus.   Limited data indicates that naltrexone is minimally excreted into breastmilk. Otezla Counseling: The side effects of Otezla were discussed with the patient, including but not limited to worsening or new depression, weight loss, diarrhea, nausea, upper respiratory tract infection, and headache. Patient instructed to call the office should any adverse effect occur.  The patient verbalized understanding of the proper use and possible adverse effects of Otezla.  All the patient's questions and concerns were addressed. Sarecycline Counseling: Patient advised regarding possible photosensitivity and discoloration of the teeth, skin, lips, tongue and gums.  Patient instructed to avoid sunlight, if possible.  When exposed to sunlight, patients should wear protective clothing, sunglasses, and sunscreen.  The patient was instructed to call the office immediately if the following severe adverse effects occur:  hearing changes, easy bruising/bleeding, severe headache, or vision changes.  The patient verbalized understanding of the proper use and possible adverse effects of sarecycline.  All of the patient's questions and concerns were addressed. Doxepin Counseling:  Patient advised that the medication is sedating and not to drive a car after taking this medication. Patient informed of potential adverse effects including but not limited to dry mouth, urinary retention, and blurry vision.  The patient verbalized understanding of the proper use and possible adverse effects of doxepin.  All of the patient's questions and concerns were addressed. Azithromycin Pregnancy And Lactation Text: This medication is considered safe during pregnancy and is also secreted in breast milk. Mirvaso Counseling: Mirvaso is a topical medication which can decrease superficial blood flow where applied. Side effects are uncommon and include stinging, redness and allergic reactions. Itraconazole Counseling:  I discussed with the patient the risks of itraconazole including but not limited to liver damage, nausea/vomiting, neuropathy, and severe allergy.  The patient understands that this medication is best absorbed when taken with acidic beverages such as non-diet cola or ginger ale.  The patient understands that monitoring is required including baseline LFTs and repeat LFTs at intervals.  The patient understands that they are to contact us or the primary physician if concerning signs are noted. Topical Steroids Counseling: I discussed with the patient that prolonged use of topical steroids can result in the increased appearance of superficial blood vessels (telangiectasias), lightening (hypopigmentation) and thinning of the skin (atrophy).  Patient understands to avoid using high potency steroids in skin folds, the groin or the face.  The patient verbalized understanding of the proper use and possible adverse effects of topical steroids.  All of the patient's questions and concerns were addressed. Bimzelx Pregnancy And Lactation Text: This medication crosses the placenta and the safety is uncertain during pregnancy. It is unknown if this medication is present in breast milk. Finasteride Female Counseling: Finasteride Counseling:  I discussed with the patient the risks of use of finasteride including but not limited to decreased libido and sexual dysfunction. Explained the teratogenic nature of the medication and stressed the importance of not getting pregnant during treatment. All of the patient's questions and concerns were addressed. Bimzelx Counseling:  I discussed with the patient the risks of Bimzelx including but not limited to depression, immunosuppression, allergic reactions and infections.  The patient understands that monitoring is required including a PPD at baseline and must alert us or the primary physician if symptoms of infection or other concerning signs are noted. Dutasteride Female Counseling: Dutasteride Counseling:  I discussed with the patient the risks of use of dutasteride including but not limited to decreased libido and sexual dysfunction. Explained the teratogenic nature of the medication and stressed the importance of not getting pregnant during treatment. All of the patient's questions and concerns were addressed.

## 2024-03-20 NOTE — H&P ADULT - ASSESSMENT
57yo M w/ pmhx of seizure disorder presents to ED for seizure. Admitted [facility].     #seizure disorder with breakthrough seizure  - CT head normal  - phenobarbital and valproic acid levels subtherapeutic   - transfer to [facility] per neuro  - c/w home meds: phenobarbital 32.4mg bid, keppra 500mg tid, depakote 500mg TID  - no o/p neurologist, needs to establish care    DVT ppx: lovenox  Dispo: from home; transfer to [facility], c/w home meds, f/u neuro

## 2024-03-20 NOTE — H&P ADULT - NSHPREVIEWOFSYSTEMS_GEN_ALL_CORE
CONSTITUTIONAL: No weakness, fevers or chills  EYES/ENT: No visual changes;  No vertigo or throat pain   NECK: No pain or stiffness  RESPIRATORY: No cough, wheezing, hemoptysis; No shortness of breath  CARDIOVASCULAR: No chest pain or palpitations  GASTROINTESTINAL: No abdominal or epigastric pain. No nausea, vomiting, or hematemesis; No diarrhea or constipation. No melena or hematochezia.  GENITOURINARY: No dysuria, frequency or hematuria  NEUROLOGICAL: + seizure; No numbness or weakness  SKIN: No itching, rashes

## 2024-03-20 NOTE — PATIENT PROFILE ADULT - FALL HARM RISK - HARM RISK INTERVENTIONS

## 2024-03-20 NOTE — CHART NOTE - NSCHARTNOTEFT_GEN_A_CORE
Spoke with patient PCP- Dr. Brittnee Oliver.  Reports patients traveling all the time between [facility], [place], and [place]  Is not compliant w. his meds. Comes to his office every few months for medication refill.   Has been told multiple times to FU w. his neurologist however doesn't comply.   Reports patient has hx of refractory seizure their fore currently on multiple seizure meds-  Keppra, Phenobarbital, and Depakote. These medications were started by his neurologist.   Previously followed neurologist Dr. Bustos but unsure when was the last time he followed up with him.

## 2024-03-21 VITALS
RESPIRATION RATE: 18 BRPM | HEART RATE: 58 BPM | DIASTOLIC BLOOD PRESSURE: 76 MMHG | SYSTOLIC BLOOD PRESSURE: 136 MMHG | OXYGEN SATURATION: 100 % | TEMPERATURE: 98 F

## 2024-03-21 LAB
ALBUMIN SERPL ELPH-MCNC: 4 G/DL — SIGNIFICANT CHANGE UP (ref 3.5–5.2)
ALP SERPL-CCNC: 54 U/L — SIGNIFICANT CHANGE UP (ref 30–115)
ALT FLD-CCNC: 12 U/L — SIGNIFICANT CHANGE UP (ref 0–41)
ANION GAP SERPL CALC-SCNC: 9 MMOL/L — SIGNIFICANT CHANGE UP (ref 7–14)
AST SERPL-CCNC: 12 U/L — SIGNIFICANT CHANGE UP (ref 0–41)
BILIRUB SERPL-MCNC: 0.4 MG/DL — SIGNIFICANT CHANGE UP (ref 0.2–1.2)
BUN SERPL-MCNC: 17 MG/DL — SIGNIFICANT CHANGE UP (ref 10–20)
CALCIUM SERPL-MCNC: 9 MG/DL — SIGNIFICANT CHANGE UP (ref 8.4–10.5)
CHLORIDE SERPL-SCNC: 105 MMOL/L — SIGNIFICANT CHANGE UP (ref 98–110)
CO2 SERPL-SCNC: 24 MMOL/L — SIGNIFICANT CHANGE UP (ref 17–32)
CREAT SERPL-MCNC: 1.2 MG/DL — SIGNIFICANT CHANGE UP (ref 0.7–1.5)
CULTURE RESULTS: SIGNIFICANT CHANGE UP
EGFR: 70 ML/MIN/1.73M2 — SIGNIFICANT CHANGE UP
GLUCOSE SERPL-MCNC: 97 MG/DL — SIGNIFICANT CHANGE UP (ref 70–99)
HCT VFR BLD CALC: 40 % — LOW (ref 42–52)
HGB BLD-MCNC: 14.2 G/DL — SIGNIFICANT CHANGE UP (ref 14–18)
MCHC RBC-ENTMCNC: 33.2 PG — HIGH (ref 27–31)
MCHC RBC-ENTMCNC: 35.5 G/DL — SIGNIFICANT CHANGE UP (ref 32–37)
MCV RBC AUTO: 93.5 FL — SIGNIFICANT CHANGE UP (ref 80–94)
NRBC # BLD: 0 /100 WBCS — SIGNIFICANT CHANGE UP (ref 0–0)
PLATELET # BLD AUTO: 203 K/UL — SIGNIFICANT CHANGE UP (ref 130–400)
PMV BLD: 11.1 FL — HIGH (ref 7.4–10.4)
POTASSIUM SERPL-MCNC: 3.8 MMOL/L — SIGNIFICANT CHANGE UP (ref 3.5–5)
POTASSIUM SERPL-SCNC: 3.8 MMOL/L — SIGNIFICANT CHANGE UP (ref 3.5–5)
PROT SERPL-MCNC: 6.8 G/DL — SIGNIFICANT CHANGE UP (ref 6–8)
RBC # BLD: 4.28 M/UL — LOW (ref 4.7–6.1)
RBC # FLD: 12 % — SIGNIFICANT CHANGE UP (ref 11.5–14.5)
SODIUM SERPL-SCNC: 138 MMOL/L — SIGNIFICANT CHANGE UP (ref 135–146)
SPECIMEN SOURCE: SIGNIFICANT CHANGE UP
WBC # BLD: 4.32 K/UL — LOW (ref 4.8–10.8)
WBC # FLD AUTO: 4.32 K/UL — LOW (ref 4.8–10.8)

## 2024-03-21 PROCEDURE — 99231 SBSQ HOSP IP/OBS SF/LOW 25: CPT

## 2024-03-21 PROCEDURE — 99239 HOSP IP/OBS DSCHRG MGMT >30: CPT

## 2024-03-21 PROCEDURE — 95720 EEG PHY/QHP EA INCR W/VEEG: CPT

## 2024-03-21 RX ORDER — DIVALPROEX SODIUM 500 MG/1
250 TABLET, DELAYED RELEASE ORAL EVERY 12 HOURS
Refills: 0 | Status: DISCONTINUED | OUTPATIENT
Start: 2024-03-21 | End: 2024-03-21

## 2024-03-21 RX ORDER — DIVALPROEX SODIUM 500 MG/1
1 TABLET, DELAYED RELEASE ORAL
Refills: 0 | DISCHARGE

## 2024-03-21 RX ORDER — DIVALPROEX SODIUM 500 MG/1
1 TABLET, DELAYED RELEASE ORAL
Qty: 0 | Refills: 0 | DISCHARGE
Start: 2024-03-21

## 2024-03-21 RX ORDER — DIVALPROEX SODIUM 500 MG/1
500 TABLET, DELAYED RELEASE ORAL THREE TIMES A DAY
Refills: 0 | Status: DISCONTINUED | OUTPATIENT
Start: 2024-03-21 | End: 2024-03-21

## 2024-03-21 RX ORDER — LEVETIRACETAM 250 MG/1
1 TABLET, FILM COATED ORAL
Refills: 0 | DISCHARGE

## 2024-03-21 RX ADMIN — DIVALPROEX SODIUM 500 MILLIGRAM(S): 500 TABLET, DELAYED RELEASE ORAL at 05:22

## 2024-03-21 RX ADMIN — LEVETIRACETAM 500 MILLIGRAM(S): 250 TABLET, FILM COATED ORAL at 09:52

## 2024-03-21 NOTE — PROGRESS NOTE ADULT - SUBJECTIVE AND OBJECTIVE BOX
Pt seen and examined. Discussed plan with Neurology team. Plan for EEG. Please see H&P from earlier today. 
Epilepsy Attending Note:     JEFF CHO    58y Male  MRN [id number]    Vital Signs Last 24 Hrs  T(C): 36.6 (21 Mar 2024 05:21), Max: 36.6 (21 Mar 2024 05:21)  T(F): 97.8 (21 Mar 2024 05:21), Max: 97.8 (21 Mar 2024 05:21)  HR: 58 (21 Mar 2024 05:21) (58 - 79)  BP: 136/76 (21 Mar 2024 05:21) (128/80 - 145/68)  BP(mean): --  RR: 18 (21 Mar 2024 05:21) (18 - 18)  SpO2: 100% (21 Mar 2024 05:21) (97% - 100%)    Parameters below as of 21 Mar 2024 05:21  Patient On (Oxygen Delivery Method): room air                              14.2   4.32  )-----------( 203      ( 21 Mar 2024 07:27 )             40.0       03-21    138  |  105  |  17  ----------------------------<  97  3.8   |  24  |  1.2    Ca    9.0      21 Mar 2024 07:27  Mg     2.0     03-19    TPro  6.8  /  Alb  4.0  /  TBili  0.4  /  DBili  x   /  AST  12  /  ALT  12  /  AlkPhos  54  03-21      MEDICATIONS  (STANDING):  divalproex  milliGRAM(s) Oral every 12 hours  enoxaparin Injectable 40 milliGRAM(s) SubCutaneous every 24 hours  influenza   Vaccine 0.5 milliLiter(s) IntraMuscular once  levETIRAcetam 500 milliGRAM(s) Oral every 12 hours    MEDICATIONS  (PRN):  LORazepam   Injectable 2 milliGRAM(s) IV Push three times a day PRN generalized tonic-clonic seizure lasting longer than 2 minutes, or two consecutive seizures without return to baseline in-between      Phenobarbital Level, Serum: <2.4 ug/mL [15.0 - 40.0] (03-19-24 @ 22:51)  Valproic Acid Level, Serum: <3.0 ug/mL [50.0 - 100.0] (03-19-24 @ 22:51)        VEEG in the last 24 hours:    Background - continuous, symmetrical, well organized, reaching frequencies in the range of 8-9 Hz    Focal and generalized slowing - none    Interictal activity - none    Events - none    Seizures - none    Impression: Normal VEEG x 24 hrs. The history of seizure disorder is not well characterized, on 3 antiseizure medications with no follow up with neurology.. Clear evidence for noncompliance based on levels.   Overall, the course of the condition is not suggestive of refractory seizure disorder with or without compliance. Primary team discussion with primary physician was documented yesterday.    Plan -   Recommend to continue VEEG monitoring, but patient does not want to stay, insists on being discharged  Patient needs to follow up with neurology, was seen by Dr. Bustos in the past.  Patient needs further work up for better characterization of the syndrome.  Recommend to discharge patient on 2 medications at the doses prescribed previously: Keppra 500mg q12hrs and Depakote 500mg q8hrs  Would not continue phenobarbital since level was not detectable on admission

## 2024-03-21 NOTE — DISCHARGE NOTE NURSING/CASE MANAGEMENT/SOCIAL WORK - NSDCPEFALRISK_GEN_ALL_CORE
For information on Fall & Injury Prevention, visit: [email or web address] OR  [email or web address] OR  https://www.cdc.gov/steadi/patient.html

## 2024-03-21 NOTE — DISCHARGE NOTE NURSING/CASE MANAGEMENT/SOCIAL WORK - PATIENT PORTAL LINK FT
You can access the FollowMyHealth Patient Portal offered by [facility] by registering at the following website: [email or web address]. By joining [facility]’s FollowMyHealth portal, you will also be able to view your health information using other applications (apps) compatible with our system.

## 2024-03-21 NOTE — DISCHARGE NOTE PROVIDER - CARE PROVIDER_API CALL
Rohit Bustos  Neurology  [address] [place] [id number]  Phone: [phone]  Fax: [phone]  Follow Up Time: 1 week

## 2024-03-21 NOTE — DISCHARGE NOTE PROVIDER - NSDCCPCAREPLAN_GEN_ALL_CORE_FT
PRINCIPAL DISCHARGE DIAGNOSIS  Diagnosis: Seizures  Assessment and Plan of Treatment: You were admitted due to seizure. Your video EEG was normal for 24 hours. You need to follow up with your neurologist Dr. Bustos. Neurologist here recommend you continue on 2 medications at the doses prescribed previously: Keppra 500mg q12hrs and Depakote 500mg q8hrs.

## 2024-03-21 NOTE — DISCHARGE NOTE PROVIDER - HOSPITAL COURSE
59yo M w/ pmhx of seizure disorder presents to ED for two seizures earlier today a few hours apart. Patient fell to the ground and was found by his niece. Patient states he is not always compliant with his medications. Endorses that he does not follow with a neurologist and he gets his AEDs from his PCP.     ED course:   vitals: 156/100, HR 66, T 98.8F, O2 95% RA  labs: unremarkable, u/a negative  imaging:   - CT head: no acute pathology  - CT c-spine: no acute fracture   - CT chest: no acute pathology    #seizure disorder with breakthrough seizure  - CT head normal  - phenobarbital and valproic acid levels undetectable  - admitted to [facility] per neuro and started on video EEG  - Spoke with patient PCP- Dr. Brittnee Oliver, who reports patients traveling all the time between [facility], [place], and [place], is not compliant w. his meds. Comes to his office every few months for medication refill, has been told multiple times to FU w. his neurologist however doesn't comply, reports patient has hx of refractory seizure their fore currently on multiple seizure meds-  Keppra, Phenobarbital, and Depakote. These medications were started by his neurologist, previously followed neurologist Dr. Bustos but unsure when was the last time he followed up with him.    Epileptologist recommendations:  Impression: Normal VEEG x 24 hrs. The history of seizure disorder is not well characterized, on 3 antiseizure medications with no follow up with neurology.. Clear evidence for noncompliance based on levels. Overall, the course of the condition is not suggestive of refractory seizure disorder with or without compliance.      Plan -   Recommend to continue VEEG monitoring, but patient does not want to stay, insists on being discharged  Patient needs to follow up with neurology, was seen by Dr. Bustos in the past.  Patient needs further work up for better characterization of the syndrome.  Recommend to discharge patient on 2 medications at the doses prescribed previously: Keppra 500mg q12hrs and Depakote 500mg q8hrs  Would not continue phenobarbital since level was not detectable on admission

## 2024-03-22 LAB
AMOBARBITAL UR QL SCN: NEGATIVE NG/ML — SIGNIFICANT CHANGE UP
AMOBARBITAL, UR RESULT: NEGATIVE NG/ML — SIGNIFICANT CHANGE UP
BARBITURATES IN-HOUSE INTERPRETATION: POSITIVE
BARBITURATES UR QL SCN: POSITIVE
BUTALBITAL UR QL SCN: NEGATIVE NG/ML — SIGNIFICANT CHANGE UP
BUTALBITAL, UR RESULT: NEGATIVE NG/ML — SIGNIFICANT CHANGE UP
LEVETIRACETAM SERPL-MCNC: <2 UG/ML — LOW (ref 10–40)
PHENOBARBITAL, UR RESULT: 1527 NG/ML — HIGH
SECOBARBITAL UR QL SCN: NEGATIVE NG/ML — SIGNIFICANT CHANGE UP
SECOBARBITAL, UR RESULT: NEGATIVE NG/ML — SIGNIFICANT CHANGE UP

## 2024-03-28 DIAGNOSIS — Z91.148 PATIENT'S OTHER NONCOMPLIANCE WITH MEDICATION REGIMEN FOR OTHER REASON: ICD-10-CM

## 2024-03-28 DIAGNOSIS — G40.309 GENERALIZED IDIOPATHIC EPILEPSY AND EPILEPTIC SYNDROMES, NOT INTRACTABLE, WITHOUT STATUS EPILEPTICUS: ICD-10-CM

## 2024-03-28 DIAGNOSIS — M25.512 PAIN IN LEFT SHOULDER: ICD-10-CM

## 2024-03-28 DIAGNOSIS — M54.2 CERVICALGIA: ICD-10-CM

## 2024-03-28 DIAGNOSIS — R07.81 PLEURODYNIA: ICD-10-CM

## 2024-05-08 NOTE — ED ADULT NURSE NOTE - MODE OF DISCHARGE

## 2024-11-13 NOTE — DISCHARGE NOTE PROVIDER - NSDCMRMEDTOKEN_GEN_ALL_CORE_FT
divalproex sodium 500 mg oral delayed release tablet: 1 tab(s) orally 3 times a day  Keppra 500 mg oral tablet: 1 tab(s) orally 2 times a day  
yes

## 2024-12-26 NOTE — PATIENT PROFILE ADULT - FALL HARM RISK - CONCLUSION
[facility]  [facility] ENCOUNTER  PHYSICAL MEDICINE AND REHABILITATION  DAILY PROGRESS NOTE/ PAS    ADMISSION DATE:  12/9/2024  DATE:  12/26/2024  CURRENT HOSPITAL DAY:  Hospital Day: 18  ATTENDING PHYSICIAN:  Moiz Pham MD  CODE STATUS:  Full Resuscitation    CHIEF COMPLAINT:  <principal problem not specified>    INTERVAL HISTORY:    Quincy Green is a 74 year old male patient admitted with Acute CVA (cerebrovascular accident)  (CMD) [I63.9]     12/11: -Chart reviewed.  No acute event overnight.  Patient seen resting comfortably in bed this morning.  Patient in good spirit.  Slept okay last night.  No pain reported.  Patient tolerating therapy.  Initial team conference today to discuss patient progress, barriers, goals, to begin discharge planning.  12/12: - Chart reviewed.  Patient seen sitting comfortably in wheelchair this morning.  No acute event overnight.  Patient in good spirit.  Slept good last night.  Denies pain.  Good appetite.  Last bowel movement 2 days ago, will continue bowel meds.  Patient tolerating therapy.  Will monitor.  12/13: -chart reviewed.  No acute event overnight.  Patient seen this morning sitting comfortably in wheelchair.  Patient in good spirit.  Denies pain.  Patient tolerating therapy.  No new complaint.  12/17: -Chart reviewed.  Patient seen resting in bed, his wife at bedside.  Pain controlled.  Patient tolerating therapy.  Patient wife reported that patient takes high dose of melatonin at night, and reported that patient follows up with neurology (Dr. Miladys Castaneda) who recommends melatonin 20mg at night. Spoke with patient wife I can increase melatonin to 9mg nightly. Will also try to reach out to Dr. Castaneda if [place] [facility], given patient history of Bipolar disorder and parkinsonism with tremors. Patient wife also was concerned about his lab result showing some decrease in blood count: RBC 2.42 (was 3.09 on 12/10), HgB 9.5 (was  10.6 on 12/10), HCT 28/9 (was 30.9 on 12/10); patient wife is worry that the drop in blood count is causing patient to be weak/tired. Did explained to patient wife that poor night sleep could also be affecting his alertness during the day. Increased melatonin to 9mg. Will reach out to hospitalist for any recs.  12/18: -chart reviewed.  No acute event overnight.  The patient is seen resting in bed.  Slept better last night; will continue current melatonin 9mg nightly. Patient get tired and fatigue easily. Discussed  with Dr. Jorge regarding the reduced blood count,; hematology consulted. Iron study perform showed Fe of 30 (low); awaiting hematology recs. Also reached out to patient neurolgy Dr. Castaneda to inquire if it's appropriate to start patient on parkinson medication like Sinemet, given his history of chronic parkinsonism. Dr. Castaneda noted history of manic episode and is concern about it; Dr. Castaneda does noted that patient does better with low dose of Olanzapine. Patient is currently on Olanzapine 10mg nightly. Discussed diet options with patient wife; she is concern that the food menu does not have much food choices to choose from. Explained to the wife that patient diet has been changed to regular diet, and that she can bring patient food from home if there is a particular kind of food that patient likes to eat. Patient also does has history of hypothyroidism and currently on Synthroid 100mcg daily; will reach out to hospitalist if that can be increase to help with energy level.  Team conference today to discuss patient progress, barriers, goals, and discharge planning.  12/19: -Chart reviewed.  Yesterday evening patient became febrile, lactate elevated to 2.7; IV fluids started per sepsis protocol (lactic acid improved from 2.7 --->1.2) and empiric Zosyn.  Patient tested positive for influenza A and was started on Tamiflu last night.  Infectious disease consulted and saw patient today.  Urine culture and blood  culture pending.    12/20: -Chart reviewed.  No acute event overnight.  Patient seen resting in bed.  Still on treatment (Tamiflu) for the influenza A as well on Zosyn. Patient afebrile. Will monitor.  12/23: -Chart reviewed.  No acute event overnight.  Patient seen sitting comfortably in wheelchair, his wife at bedside.  Patient feeling a little better than last few days; Patient had completed the Tamiflu treatment; still on antibiotic -Rocephin. Patient afebrile; vitals stable. Patient with low WBC at 2.8. ID consulted. Will monitor  12/24: -Chart reviewed.  No acute event overnight.  The patient seen resting comfortably in bed, his wife at the bedside.  The patient did better in therapy today; he is feeling better, still on IV antibiotics.  Good appetite, his wife brought him food from home.  Pain controlled.  Sleeping good with current melatonin 9 mg nightly.  Team conference today to discuss patient progress, barriers, goals, and discharge planning.  12/26: -Chart reviewed.  Patient seen resting comfortably in bed, his wife at bedside.  Earlier this morning, the patient noted to be feeling weaker and required more assist during therapy this morning. Lab obtained, showing some improvement with blood count - Hgb 9.0, WBC 5.5 (was 2.8 on 12/23); Cr 1.36 (was 1.18 on 12/23). Patient given 500ml IV bolus, UA CS ordered per hospitalist. Did encouraged oral food/fluid intake. Patient reportedly had a poor sleep last night; per patient wife, patient took naps/slept much during the day yesterday. Encouraged patient/his wife to avoid excessive day time sleep so that he can sleep better at night. Will monitor      IMPRESSION:  Acute midbrain infarct involving left cerebellum and right PVWM.  Dysmetria  Left ptosis, 3rd nerve palsy  Bipolar disorder on depakote with chronic cognitive impairment  Chronic drug-induced parkinsonism with tremors  SIRS, likely secondary to influenza  Influenza A positive   Possible  UTI  Neutropenia   Severe AS s/p AVR  Chronic diastolic heart failure  ANICETO, BUN/CR slowly rising  Thrombocytopenia- monitoring  DVT/PE risk      PLAN:    REHAB: Patient beginning full intensive rehab program.  No medical issues precluding full involvement.     ID: Will monitor for signs/symptoms of infection     CARD: Will monitor cardiac status with increase activity     NEURO: Will monitor neuro status     PAIN: Continue pain meds. Will monitor     PULM: Will monitor respiratory status with increase activity     HEME: intermittent monitoring     GI:  Will place on bowel program.     :  Will place on bladder program.     SKIN: Nursing to monitor for any issues with skin integrity.     VASC: Continue Heparin subQ at prophylactic dosage.     ELECTROLYTES:  Will monitor and make adjustments as needed.      NUTRITION: Will monitor diet     DISPOSITION: Team conference today (12/24) to discuss patient progress, barriers, goals, and discharge planning.  Tentative discharge date  extended to 1/6/25.     _________________________________________________________________________________________________________________________________________________________    HPI:  Quincy Green is a 74 year old male with history of drug-induced parkinsonism, bipolar disorder, hypothyroidism, HTN, HL, severe AS, chronic diastolic CHF, is admitted to [facility] on 12/3/24 for planned TAVR.  Postprocedure, he was note to havce left ptosis and some right sided weakness. He was not a candidate for TNK nor LAQUITA. CTA showed no large vessel occlusion. CTH showed early infarct in the left midbrain/ruby. TTE showed EF of 505 with mild left atrial dilatation.  MRI of the brain showed acute infarct in the midbrain involving the left cerebellum and right periventricular white matter. Patient with functional deficit. Physical medicine and rehabilitation evaluated patient for rehab needs and acute inpatient rehab recommended. Admitted to [facility] on 12/9      MEDICATIONS:    The medication list was reviewed today.       HISTORIES:     I have reviewed the past medical history, family history, social history, medications and allergies listed in the medical record as obtained by my nursing staff and support staff and agree with their documentation.       REVIEW OF SYSTEMS:  ROS  Constitutional: Weakness, No fever, No chills, No sweats.  Respiratory: No shortness of breath, No cough.  Cardiovascular: No chest pain, No syncope.  Gastrointestinal: No nausea, No vomiting,   Endocrine: No polyuria, No cold intolerance.  Musculoskeletal: Joint pain.  Neurologic: +weakness, poor balance, abnormal gait    OBJECTIVE:    VITAL SIGNS:     Vital Last Value 24 Hour Range   Temperature 97.3 °F (36.3 °C) (12/26/24 0602) Temp  Min: 97.3 °F (36.3 °C)  Max: 97.3 °F (36.3 °C)   Pulse 76 (12/26/24 0857) Pulse  Min: 60  Max: 76   Respiratory 16 (12/26/24 0857) Resp  Min: 16  Max: 16   Non-Invasive  Blood Pressure 109/70 (12/26/24 0857) BP  Min: 109/70  Max: 137/75   Pulse Oximetry 98 % (12/26/24 0857) SpO2  Min: 98 %  Max: 98 %     Vital Today Admitted   Weight 94.3 kg (207 lb 14.3 oz) (12/09/24 2147) Weight: 94.3 kg (207 lb 14.3 oz) (12/09/24 2147)       INTAKE/OUTPUT:      Intake/Output Summary (Last 24 hours) at 12/26/2024 1742  Last data filed at 12/26/2024 0857  Gross per 24 hour   Intake 100 ml   Output 2850 ml   Net -2750 ml       Bowel: Stool Amount: Medium (12/26/24 0200)  Unmeasured Stool Occurrence: 1 (with therapy) (12/26/24 1200)     Bladder PVR: Bladder Scan  Reason for Scan: per order (12/10/24 1901)  Bladder Scanned Volume (mL): 17 mL (12/10/24 1901)    Current Functional Status:     Mobility (since 12/24/2024)       rolling left  moderate assist    rolling right  moderate assist    supine to sit  moderate assist; maximal assist    sit to supine  moderate assist; maximal assist    sit to stand  maximal assist; total assist - non-dependent; with verbal cues  stand to almas  steady; Max A to pull up, Total for postural control    stand to sit  maximal assist; with verbal cues; moderate assist    stand pivot  total assist - non-dependent  Max A 1 for postural control/midline balance; 1 person for almas stedy mgmt    squat pivot  total assist - non-dependent  almas steady    transfers assistive devices  gait belt; non-mechanical sit to stand lift  2 person assist    tub transfer assist  not attempted due to environmental limitations    gait assistance  total assist - non-dependent  mod A + w/c follow    1st trial  8  x4    gait surface  even            Self Care/ADL (since 12/24/2024)       eating assist  minimal assist  12/23/26    eating position  wheelchair    eating deficit  scoop assist    eating/feeding equipment  weighted utensils    grooming assist  maximal assist    oral hygiene assist  maximal assist    oral hygiene position  edge of bed    grooming/oral hygiene defict  teeth care; applying deodorant    upper body dressing assist  total assist - non-dependent; maximal assist; with verbal cues    upper body dressing assist  total assist - non-dependent; maximal assist; with verbal cues    upper body dressing  wheelchair    upper body dressing deficit  steadying; increased time to complete; pull down in back; thread right upper extermity; pull over head; pull around back; thread left upper extermity; verbal cueing    lower body dressing assist  total assist - non-dependent    lower body dressing position  standing; wheelchair  standing in almas steady to pull up over hips    footwear assist  total assist - non-dependent    footware position  wheelchair    lower body dressing deficit  increased time to complete; don/doff left sock; don/doff right sock; thread right lower extremity into underwear; thread left lower extremity into underwear; thread left lower extremity into pants; thread right lower extremity into pants; pull up over hips; steadying    toileting assist  total assist -  non-dependent  simulated    toileting deficit  clothing management down; perineal hygiene; verbal cueing; clothing management up; increased time to complete; steadying    bathing assist  total assist - non-dependent    bathing position  wheelchair    bathing deficit  steadying; increased time to complete; perineal area; buttocks; left lower leg including foot; right upper leg; right lower leg including foot; left upper leg; right arm; left arm; chest            Communication/Cognition (since 12/24/2024)       form of communication  delayed responses    following commands  follows one step commands inconsistently    Safety Judgement  decreased awareness of need for assistance; decreased awareness of need for safety    Awareness of Deficits  decreased awareness of deficits                PHYSICAL EXAMINATION:  Physical Exam  GEN: Alert, appears comfortable, in NAD  EYES: normal conjunctiva on R eye. Pt unable to open left eye spontaneously (left eye closed)  HENT: Normocephalic, AT  RESP: breathing comfortably on room air, symmetric expansion   CVS: Regular rate, no pitting peripheral edema  GI: Soft, nontender, nondistended,   PSYCH: cooperative, pleasant; mood and affect appropriate  EXTR: no LLE lymphedema  SKIN: dry, warm  NEURO:   - Awake, alert; oriented to person, but oriented to place and time with cues. Slow processing speed.   - Sensation to touch grossly intact  -Motor: Extremities weakness. BUE and BLE ~ 2+ to 3/5. Impaired hand coordination    LABORATORY DATA:    Recent Labs   Lab 12/26/24  0954 12/25/24  0541 12/23/24  0516 12/22/24  0516 12/21/24  0548   WBC 5.5 4.7 2.8*  --  6.0   RBC 2.75* 2.52* 2.47*  --  2.04*   HGB 9.0* 8.0* 8.0*   < > 7.0*   HCT 27.6* 25.4* 24.3*  --  21.2*   .4* 100.8* 98.4  --  103.9*   MCH 32.7 31.7 32.4  --  34.3*   MCHC 32.6 31.5* 32.9  --  33.0   RDWCV 15.2* 15.1* 14.8  --  13.9    145 121*  --  155   TLYMPH  --   --  46  --  25    < > = values in this interval  not displayed.       Recent Labs   Lab 12/26/24  0954 12/23/24  0516 12/21/24  0548   SODIUM 141 137 140   CHLORIDE 113* 109 111*   BUN 16 13 26*   POTASSIUM 4.9 4.3 4.1   GLUCOSE 140* 125* 130*   CREATININE 1.36* 1.18* 1.72*   CALCIUM 9.2 8.5 7.9*       No results found      No results found      IMAGING STUDIES:   XR CHEST AP OR PA   Final Result   No evidence of acute cardiopulmonary pathology.         Electronically Signed by: ROSE MARIE LIRA DO    Signed on: 12/18/2024 7:36 PM    Workstation ID: [id number]              Moiz Pham MD  Dept of Physical Medicine & Rehabilitation   [facility]              Fall with Harm Risk

## 2025-01-27 NOTE — ED PROVIDER NOTE - PROGRESS NOTE DETAILS
What Is The Reason For Today's Visit?: Full Body Skin Examination
What Is The Reason For Today's Visit? (Being Monitored For X): the development of a new lesion
FILI: Reviewed all results and necessity for follow up. Counseled on red flags and to return for them.  Patient appears well on discharge.

## 2025-01-27 NOTE — PATIENT PROFILE ADULT - FUNCTIONAL ASSESSMENT - BASIC MOBILITY SCORE.
Caller: PAT SIMMONS    Relationship: Emergency Contact    Best call back number: [phone]    Requested Prescriptions:   Requested Prescriptions     Pending Prescriptions Disp Refills    HYDROcodone-acetaminophen (Norco) 7.5-325 MG per tablet 28 tablet 0     Sig: Take 1 tablet by mouth Every 6 (Six) Hours As Needed for Moderate Pain.        Pharmacy where request should be sent:  [facility]     Last office visit with prescribing clinician: 11/7/2024   Last telemedicine visit with prescribing clinician: Visit date not found   Next office visit with prescribing clinician: Visit date not found     Additional details provided by patient:     Does the patient have less than a 3 day supply:  [] Yes  [] No    Would you like a call back once the refill request has been completed: [] Yes [] No    If the office needs to give you a call back, can they leave a voicemail: [] Yes [] No    Orlando Barrera Rep   01/27/25 12:17 EST      24

## 2025-05-08 NOTE — ED ADULT TRIAGE NOTE - NS ED NURSE BANDS TYPE
History of present illness- I had the pleasure of meeting this pleasant 76 y.o. gentleman in the pre op clinic prior to his elective Neuro surgery. The patient is new to me .    I have obtained the history by speaking to the patient and by reviewing the electronic health records.    Events leading up to surgery / History of presenting illness -    NPH (normal pressure hydrocephalus)   Communicating hydrocephalus as a sequelae of ICH.    Symptoms- has balance problems but no urinary incontinence  He has memory problem    He had lumbar puncture and the balance problem got better    He has no troublesome headache    Relevant health conditions of significance for the perioperative period/ History of presenting illness -    Pulmonary embolism-Eliquis  CHF, Edema, -Jardiance, Lasix, spironolactone  Allergies-Flonase  Constipation-Linzess  HTN-losartan  Urinary incontinence-VESIcare  BMI 32.98   BPH-Flomax   Oct 2024 Moderate pulmonary hypertension. The estimated pulmonary artery systolic pressure is 53 mmHg.  Subarachnoid hemorrhage in 2023 (atraumatic, vessel imaging and angiogram failed to demonstrate aneurysm, vascular malformation),  Memory problem-Aricept  Pernicious anemia and vitamin B12 deficiency on intramuscular B12   He was diagnosed with lymphoma in 1992. He underwent an autologous bone marrow transplantation at [facility]    11/14/2023: CTA of Head and Neck: Bilateral pulmonary emboli.  PE was unprovoked.  11/2023: SAH. Conservative care.  11/2023: Diagnosed with pulmonary embolism and benefit with anticoagulation felt to outweigh risk  Hold apixiban for 3 days with acceptable risk.  Acid reflux      Lives with wife in a 3rd level apartment complex that has a an elevator  Retired from the   He did  after retired from   Pets- None  Children -2 daughters and 2 sons all of them are grown and 1 live local who is a daughter  Has help post op    Not known to have    Prediabetes , Diabetes Mellitus, Lung problem, Thyroid problem, Kidney problem, sleep apnea, COVID infection, fatty liver , blood vessels stent , tobacco smoking, mental health problems , gout, allergies       Name band;
